# Patient Record
Sex: FEMALE | Race: OTHER | HISPANIC OR LATINO | Employment: UNEMPLOYED | URBAN - METROPOLITAN AREA
[De-identification: names, ages, dates, MRNs, and addresses within clinical notes are randomized per-mention and may not be internally consistent; named-entity substitution may affect disease eponyms.]

---

## 2017-04-06 ENCOUNTER — GENERIC CONVERSION - ENCOUNTER (OUTPATIENT)
Dept: OTHER | Facility: OTHER | Age: 47
End: 2017-04-06

## 2018-03-07 NOTE — PROGRESS NOTES
History of Present Illness    Revaccination   Vaccine Information: Vaccine(s) Given (names): hep a  Spoke with patient regarding vaccine out of temperature range, risks and benefits of revaccination and risks of not revaccinating  Action(s): Pt will be revaccinated  Pt called (attempt 1): [phone] [facility]  Letter Sent (Regular and Certified): [phone]  Other Information: patient is a  and will call our office back to schedule her series when she is in the area  Active Problems    1  Active smoker (305 1) (Z72 0)   2  Arthropathy (716 90) (M12 9)   3  Benign essential hypertension (401 1) (I10)   4  Chronic hepatitis C (070 54) (B18 2)   5  Chronic migraine without aura, with intractable migraine, so stated, without mention of   status migrainosus (346 71) (G43 719)   6  Chronic pain (338 29) (G89 29)   7  Diffuse disease of connective tissue (710 9) (M35 9)   8  Migraine headache (346 90) (G43 909)   9  Need for revaccination (V05 9) (Z23)   10  Supraventricular tachycardia (427 89) (I47 1)   11  Tobacco use (305 1) (Z72 0)   12  Vitamin D deficiency (268 9) (E55 9)    Immunizations  Hepatitis A --- Nell Beatriz: 2014   Hepatitis B --- Nell Husk: 2014   Influenza --- Nellruddy Henderson: 15-Oct-2012; [facility]     Current Meds   1  Nadolol 20 MG Oral Tablet; Take 1 tablet daily   2  Naproxen 500 MG Oral Tablet   3  Naratriptan HCl - 2 5 MG Oral Tablet; TAKE 1 TABLET AT ONSET OF HEADACHE, MAY   REPEAT ONCE IN 2 HOURS (max 2 tabs/day; 3days/wk)    Allergies    1   Bactrim TABS    Signatures   Electronically signed by : Rosa Elena Quiñonez DO; 2017  4:40PM EST                       (Author)

## 2019-07-03 ENCOUNTER — APPOINTMENT (EMERGENCY)
Dept: RADIOLOGY | Facility: HOSPITAL | Age: 49
End: 2019-07-03
Payer: COMMERCIAL

## 2019-07-03 ENCOUNTER — HOSPITAL ENCOUNTER (EMERGENCY)
Facility: HOSPITAL | Age: 49
Discharge: HOME | End: 2019-07-03
Attending: EMERGENCY MEDICINE
Payer: COMMERCIAL

## 2019-07-03 ENCOUNTER — APPOINTMENT (EMERGENCY)
Dept: NON INVASIVE DIAGNOSTICS | Facility: HOSPITAL | Age: 49
End: 2019-07-03
Payer: COMMERCIAL

## 2019-07-03 VITALS
HEART RATE: 79 BPM | TEMPERATURE: 98.7 F | RESPIRATION RATE: 16 BRPM | HEIGHT: 67 IN | OXYGEN SATURATION: 98 % | SYSTOLIC BLOOD PRESSURE: 111 MMHG | BODY MASS INDEX: 32.98 KG/M2 | WEIGHT: 210.1 LBS | DIASTOLIC BLOOD PRESSURE: 81 MMHG

## 2019-07-03 DIAGNOSIS — M79.604 RIGHT LEG PAIN: Primary | ICD-10-CM

## 2019-07-03 DIAGNOSIS — S80.11XA CONTUSION OF RIGHT LOWER EXTREMITY, INITIAL ENCOUNTER: ICD-10-CM

## 2019-07-03 DIAGNOSIS — R52 PAIN: ICD-10-CM

## 2019-07-03 PROCEDURE — 93971 EXTREMITY STUDY: CPT | Performed by: SURGERY

## 2019-07-03 PROCEDURE — 73610 X-RAY EXAM OF ANKLE: CPT

## 2019-07-03 PROCEDURE — 73590 X-RAY EXAM OF LOWER LEG: CPT

## 2019-07-03 PROCEDURE — 73630 X-RAY EXAM OF FOOT: CPT

## 2019-07-03 PROCEDURE — 99284 EMERGENCY DEPT VISIT MOD MDM: CPT | Performed by: PHYSICIAN ASSISTANT

## 2019-07-03 PROCEDURE — 96372 THER/PROPH/DIAG INJ SC/IM: CPT

## 2019-07-03 PROCEDURE — 93971 EXTREMITY STUDY: CPT

## 2019-07-03 PROCEDURE — 99284 EMERGENCY DEPT VISIT MOD MDM: CPT

## 2019-07-03 RX ORDER — KETOROLAC TROMETHAMINE 30 MG/ML
15 INJECTION, SOLUTION INTRAMUSCULAR; INTRAVENOUS ONCE
Status: COMPLETED | OUTPATIENT
Start: 2019-07-03 | End: 2019-07-03

## 2019-07-03 RX ADMIN — KETOROLAC TROMETHAMINE 15 MG: 30 INJECTION, SOLUTION INTRAMUSCULAR at 14:35

## 2019-07-03 NOTE — ED NOTES
Pt being transported to vascular by JAYANT Gutierrez at this time        Adi Kelly RN  07/03/19 7439

## 2019-07-03 NOTE — DISCHARGE INSTRUCTIONS
- Take ibuprofen (Motrin) every 6 - 8 hours as written on bottle for pain  - Take acetaminophen (Tylenol) every 4 - 6 hours as written on bottle for pain

## 2019-07-03 NOTE — ED PROVIDER NOTES
History  Chief Complaint   Patient presents with    Leg Pain     Pt reports 3 days ago she walking into a "tent spike" on the ground  Pt reports she has a pin in her R ankle  Pt reports increased pain  Swelling and ecchymosis noted to the R ankle and foot  78-year-old female presents for evaluation of right lower leg, ankle and foot pain for the past 3 days  Reports that she was in [facility] 3 days ago at [employer] and states that while she was walking she hit her leg against an iron tent stake  Noticed bruising initially over right leg, medial aspect and yesterday noted it over her ankle  Has a history of surgery in the past at age 13, with a screw in place  States that she continued walking with pain  Has been taking ibuprofen without much relief  States that she returned from [facility] last night on a 9 hour flight  She states she has been recently noting bilateral leg swelling over the past few months  States she is a   States she wore socks today and noted swelling in her leg  Also reports some cramping in her right leg  Denies history of DVT or PE in the past  She is able to bear weight and walk on her feet  Denies paraesthesias, numbness or open wounds  Denies SOB, chest pain or dyspnea  None       Past Medical History:   Diagnosis Date    Hepatitis C        Past Surgical History:   Procedure Laterality Date    ANKLE SURGERY Right     TUBAL LIGATION         History reviewed  No pertinent family history  I have reviewed and agree with the history as documented  Social History     Tobacco Use    Smoking status: Current Every Day Smoker     Packs/day: 2 00     Types: Cigarettes    Smokeless tobacco: Never Used   Substance Use Topics    Alcohol use: Yes     Comment: socially    Drug use: Never        Review of Systems   Constitutional: Negative for chills and fever  Cardiovascular: Positive for leg swelling  Musculoskeletal: Positive for arthralgias and myalgias     Skin: Positive for color change (bruising)  Negative for pallor, rash and wound  Neurological: Negative for weakness and numbness  Physical Exam  Physical Exam   Constitutional: She appears well-developed and well-nourished  No distress  HENT:   Head: Normocephalic and atraumatic  Musculoskeletal: Normal range of motion  She exhibits tenderness  She exhibits no edema  Right ankle: She exhibits swelling and ecchymosis  She exhibits normal range of motion, no deformity, no laceration and normal pulse  Tenderness  Medial malleolus tenderness found  No lateral malleolus, no AITFL, no CF ligament, no posterior TFL, no head of 5th metatarsal and no proximal fibula tenderness found  Full AROM and 5/5 strength in her LE b/l  Swelling and 2+ pitting edema noted to legs  bruising noted over medial distal aspect of tibia and into medial malleolus and dorsal aspect of foot  Tenderness to palpation  NVI  Neurological: She is alert  Skin: Skin is warm  Capillary refill takes less than 2 seconds  No rash noted  She is not diaphoretic  No erythema  Psychiatric: She has a normal mood and affect  Vitals reviewed        Vital Signs  ED Triage Vitals [07/03/19 1341]   Temperature Pulse Respirations Blood Pressure SpO2   (!) 97 4 °F (36 3 °C) 103 20 131/95 98 %      Temp Source Heart Rate Source Patient Position - Orthostatic VS BP Location FiO2 (%)   Tympanic Monitor Sitting Left arm --      Pain Score       5           Vitals:    07/03/19 1341 07/03/19 1541   BP: 131/95 111/81   Pulse: 103 79   Patient Position - Orthostatic VS: Sitting Sitting         Visual Acuity      ED Medications  Medications   ketorolac (TORADOL) injection 15 mg (15 mg Intramuscular Given 7/3/19 1435)       Diagnostic Studies  Results Reviewed     None                 VAS lower limb venous duplex study, unilateral/limited   Final Result by Rancho Jackson MD (07/03 9899)      XR foot 3+ views RIGHT   Final Result by Amira Paul MD (07/03 1515)      No acute osseous abnormality  Workstation performed: [id number]         XR ankle 3+ views RIGHT   Final Result by Hoa Butler MD (07/03 1516)      No acute osseous abnormality  Workstation performed: [id number]         XR tibia fibula 2 views RIGHT   Final Result by Hoa Butler MD (07/03 1517)      No acute osseous abnormality  Workstation performed: [id number]                    Procedures  Procedures       ED Course  ED Course as of Jul 04 1128 Wed Jul 03, 2019   1553 Called vascular lab - negative for DVT  MDM  Number of Diagnoses or Management Options  Contusion of right lower extremity, initial encounter:   Right leg pain:   Diagnosis management comments: No fractures noted on read  Negative DVT  Will advise RICE and anti-inflammatories for pain  Pt states she does not want crutches at this time  Is able to ambulate  Amount and/or Complexity of Data Reviewed  Tests in the radiology section of CPT®: ordered and reviewed        Disposition  Final diagnoses:   Right leg pain   Contusion of right lower extremity, initial encounter     Time reflects when diagnosis was documented in both MDM as applicable and the Disposition within this note     Time User Action Codes Description Comment    7/3/2019  2:37 PM Duke Brochure Add [R52] Pain     7/3/2019  3:58 PM Chrissy Henson Add [Z49 863] Right leg pain     7/3/2019  3:58 PM Chrissy Henson Add [S80 11XA] Contusion of right lower extremity, initial encounter       ED Disposition     ED Disposition Condition Date/Time Comment    Discharge Stable Wed Jul 3, 2019  3:58 PM Williemae Samples discharge to home/self care              Follow-up Information     Follow up With Specialties Details Why Contact Info Additional [address] [facility] Schedule an appointment as soon as possible for a visit in 2 weeks Follow up for re-check of symptoms, If symptoms persist  [address] [id number]  [address], [address] [place] [address], [id number]          There are no discharge medications for this patient  No discharge procedures on file      ED Provider  Electronically Signed by           Owen Aguirre PA-C  07/04/19 2820

## 2020-05-05 ENCOUNTER — APPOINTMENT (EMERGENCY)
Dept: RADIOLOGY | Facility: HOSPITAL | Age: 50
End: 2020-05-05
Payer: COMMERCIAL

## 2020-05-05 ENCOUNTER — HOSPITAL ENCOUNTER (EMERGENCY)
Facility: HOSPITAL | Age: 50
Discharge: HOME | End: 2020-05-05
Attending: EMERGENCY MEDICINE | Admitting: EMERGENCY MEDICINE
Payer: COMMERCIAL

## 2020-05-05 VITALS
OXYGEN SATURATION: 99 % | HEART RATE: 63 BPM | BODY MASS INDEX: 33.39 KG/M2 | DIASTOLIC BLOOD PRESSURE: 73 MMHG | RESPIRATION RATE: 16 BRPM | SYSTOLIC BLOOD PRESSURE: 114 MMHG | WEIGHT: 210 LBS | TEMPERATURE: 98 F

## 2020-05-05 DIAGNOSIS — R00.2 PALPITATIONS: ICD-10-CM

## 2020-05-05 DIAGNOSIS — H54.7 VISION PROBLEMS: Primary | ICD-10-CM

## 2020-05-05 LAB
ANION GAP SERPL CALCULATED.3IONS-SCNC: 7 MMOL/L (ref 4–13)
BASOPHILS # BLD AUTO: 0.05 THOUSANDS/ΜL (ref 0–0.1)
BASOPHILS NFR BLD AUTO: 0 % (ref 0–1)
BUN SERPL-MCNC: 9 MG/DL (ref 5–25)
CALCIUM SERPL-MCNC: 9.8 MG/DL (ref 8.3–10.1)
CHLORIDE SERPL-SCNC: 112 MMOL/L (ref 100–108)
CO2 SERPL-SCNC: 24 MMOL/L (ref 21–32)
CREAT SERPL-MCNC: 0.79 MG/DL (ref 0.6–1.3)
EOSINOPHIL # BLD AUTO: 0.47 THOUSAND/ΜL (ref 0–0.61)
EOSINOPHIL NFR BLD AUTO: 4 % (ref 0–6)
ERYTHROCYTE [DISTWIDTH] IN BLOOD BY AUTOMATED COUNT: 13 % (ref 11.6–15.1)
GFR SERPL CREATININE-BSD FRML MDRD: 87 ML/MIN/1.73SQ M
GLUCOSE SERPL-MCNC: 97 MG/DL (ref 65–140)
HCT VFR BLD AUTO: 45.5 % (ref 34.8–46.1)
HGB BLD-MCNC: 15.5 G/DL (ref 11.5–15.4)
IMM GRANULOCYTES # BLD AUTO: 0.04 THOUSAND/UL (ref 0–0.2)
IMM GRANULOCYTES NFR BLD AUTO: 0 % (ref 0–2)
LYMPHOCYTES # BLD AUTO: 2.75 THOUSANDS/ΜL (ref 0.6–4.47)
LYMPHOCYTES NFR BLD AUTO: 24 % (ref 14–44)
MCH RBC QN AUTO: 31.9 PG (ref 26.8–34.3)
MCHC RBC AUTO-ENTMCNC: 34.1 G/DL (ref 31.4–37.4)
MCV RBC AUTO: 94 FL (ref 82–98)
MONOCYTES # BLD AUTO: 0.76 THOUSAND/ΜL (ref 0.17–1.22)
MONOCYTES NFR BLD AUTO: 7 % (ref 4–12)
NEUTROPHILS # BLD AUTO: 7.64 THOUSANDS/ΜL (ref 1.85–7.62)
NEUTS SEG NFR BLD AUTO: 65 % (ref 43–75)
NRBC BLD AUTO-RTO: 0 /100 WBCS
PLATELET # BLD AUTO: 203 THOUSANDS/UL (ref 149–390)
PMV BLD AUTO: 11.8 FL (ref 8.9–12.7)
POTASSIUM SERPL-SCNC: 4 MMOL/L (ref 3.5–5.3)
RBC # BLD AUTO: 4.86 MILLION/UL (ref 3.81–5.12)
SODIUM SERPL-SCNC: 143 MMOL/L (ref 136–145)
TROPONIN I SERPL-MCNC: <0.02 NG/ML
WBC # BLD AUTO: 11.71 THOUSAND/UL (ref 4.31–10.16)

## 2020-05-05 PROCEDURE — 93005 ELECTROCARDIOGRAM TRACING: CPT

## 2020-05-05 PROCEDURE — 99285 EMERGENCY DEPT VISIT HI MDM: CPT | Performed by: EMERGENCY MEDICINE

## 2020-05-05 PROCEDURE — 99285 EMERGENCY DEPT VISIT HI MDM: CPT

## 2020-05-05 PROCEDURE — 80048 BASIC METABOLIC PNL TOTAL CA: CPT | Performed by: EMERGENCY MEDICINE

## 2020-05-05 PROCEDURE — 70498 CT ANGIOGRAPHY NECK: CPT

## 2020-05-05 PROCEDURE — 84484 ASSAY OF TROPONIN QUANT: CPT | Performed by: EMERGENCY MEDICINE

## 2020-05-05 PROCEDURE — 70496 CT ANGIOGRAPHY HEAD: CPT

## 2020-05-05 PROCEDURE — 96360 HYDRATION IV INFUSION INIT: CPT

## 2020-05-05 PROCEDURE — 85025 COMPLETE CBC W/AUTO DIFF WBC: CPT | Performed by: EMERGENCY MEDICINE

## 2020-05-05 PROCEDURE — 36415 COLL VENOUS BLD VENIPUNCTURE: CPT | Performed by: EMERGENCY MEDICINE

## 2020-05-05 RX ADMIN — IOHEXOL 100 ML: 350 INJECTION, SOLUTION INTRAVENOUS at 21:36

## 2020-05-05 RX ADMIN — SODIUM CHLORIDE 1000 ML: 0.9 INJECTION, SOLUTION INTRAVENOUS at 20:41

## 2020-05-06 ENCOUNTER — TELEPHONE (OUTPATIENT)
Dept: NEUROLOGY | Facility: CLINIC | Age: 50
End: 2020-05-06

## 2020-05-06 LAB
ATRIAL RATE: 69 BPM
P AXIS: 37 DEGREES
PR INTERVAL: 122 MS
QRS AXIS: 31 DEGREES
QRSD INTERVAL: 80 MS
QT INTERVAL: 372 MS
QTC INTERVAL: 398 MS
T WAVE AXIS: 41 DEGREES
VENTRICULAR RATE: 69 BPM

## 2020-05-06 PROCEDURE — 93010 ELECTROCARDIOGRAM REPORT: CPT | Performed by: INTERNAL MEDICINE

## 2020-05-11 ENCOUNTER — OFFICE VISIT (OUTPATIENT)
Dept: CARDIOLOGY CLINIC | Facility: CLINIC | Age: 50
End: 2020-05-11
Payer: COMMERCIAL

## 2020-05-11 VITALS
SYSTOLIC BLOOD PRESSURE: 118 MMHG | BODY MASS INDEX: 33.49 KG/M2 | OXYGEN SATURATION: 96 % | HEIGHT: 67 IN | WEIGHT: 213.4 LBS | DIASTOLIC BLOOD PRESSURE: 60 MMHG | HEART RATE: 88 BPM

## 2020-05-11 DIAGNOSIS — R55 SYNCOPE, UNSPECIFIED SYNCOPE TYPE: Primary | ICD-10-CM

## 2020-05-11 DIAGNOSIS — I47.1 SVT (SUPRAVENTRICULAR TACHYCARDIA) (HCC): ICD-10-CM

## 2020-05-11 DIAGNOSIS — E55.9 VITAMIN D DEFICIENCY: ICD-10-CM

## 2020-05-11 PROBLEM — I47.10 SVT (SUPRAVENTRICULAR TACHYCARDIA): Status: ACTIVE | Noted: 2020-05-11

## 2020-05-11 PROCEDURE — 99204 OFFICE O/P NEW MOD 45 MIN: CPT | Performed by: INTERNAL MEDICINE

## 2020-10-01 ENCOUNTER — TELEPHONE (OUTPATIENT)
Dept: OBGYN CLINIC | Facility: CLINIC | Age: 50
End: 2020-10-01

## 2022-04-24 ENCOUNTER — OFFICE VISIT (OUTPATIENT)
Dept: URGENT CARE | Facility: MEDICAL CENTER | Age: 52
End: 2022-04-24
Payer: COMMERCIAL

## 2022-04-24 VITALS
RESPIRATION RATE: 20 BRPM | OXYGEN SATURATION: 97 % | BODY MASS INDEX: 35.61 KG/M2 | TEMPERATURE: 97.4 F | DIASTOLIC BLOOD PRESSURE: 70 MMHG | WEIGHT: 224 LBS | HEART RATE: 101 BPM | SYSTOLIC BLOOD PRESSURE: 140 MMHG

## 2022-04-24 DIAGNOSIS — K04.7 TOOTH ABSCESS: Primary | ICD-10-CM

## 2022-04-24 PROCEDURE — 99213 OFFICE O/P EST LOW 20 MIN: CPT | Performed by: FAMILY MEDICINE

## 2022-04-24 RX ORDER — CLINDAMYCIN HYDROCHLORIDE 300 MG/1
300 CAPSULE ORAL 4 TIMES DAILY
Qty: 28 CAPSULE | Refills: 0 | Status: SHIPPED | OUTPATIENT
Start: 2022-04-24 | End: 2022-05-01

## 2022-04-24 NOTE — PATIENT INSTRUCTIONS
I prescribed clindamycin 300 mg orally 4 times a day for up to 7 days  Recommended patient use topical Ambesol or Oragel for gum pain  Recommend oral rinse containing warm water and peroxide as needed  She is to contact her dentist as soon as possible  Dental Abscess   WHAT YOU NEED TO KNOW:   A dental abscess is a collection of pus in or around a tooth  A dental abscess is caused by bacteria  The bacteria can enter the tooth when the enamel (outer part of the tooth) is damaged by tooth decay  Bacteria can also enter the tooth through a chip in the tooth or a cut in the gum  Food particles that are stuck between the teeth for a long time may also lead to an abscess  DISCHARGE INSTRUCTIONS:   Return to the emergency department if:   · You have severe pain in your tooth or jaw  · You have trouble breathing because of pain or swelling  Call your doctor if:   · Your symptoms get worse, even after treatment  · Your mouth is bleeding  · You cannot eat or drink because of pain or swelling  · Your abscess returns  · You have an injury that causes a crack in your tooth  · You have questions or concerns about your condition or care  Medicines: You may  need any of the following:  · Antibiotics  help treat a bacterial infection  · NSAIDs , such as ibuprofen, help decrease swelling, pain, and fever  This medicine is available with or without a doctor's order  NSAIDs can cause stomach bleeding or kidney problems in certain people  If you take blood thinner medicine, always ask your healthcare provider if NSAIDs are safe for you  Always read the medicine label and follow directions  · Acetaminophen  decreases pain and fever  It is available without a doctor's order  Ask how much to take and how often to take it  Follow directions   Read the labels of all other medicines you are using to see if they also contain acetaminophen, or ask your doctor or pharmacist  Acetaminophen can cause liver damage if not taken correctly  Do not use more than 4 grams (4,000 milligrams) total of acetaminophen in one day  · Prescription pain medicine  may be given  Ask your healthcare provider how to take this medicine safely  Some prescription pain medicines contain acetaminophen  Do not take other medicines that contain acetaminophen without talking to your healthcare provider  Too much acetaminophen may cause liver damage  Prescription pain medicine may cause constipation  Ask your healthcare provider how to prevent or treat constipation  · Take your medicine as directed  Contact your healthcare provider if you think your medicine is not helping or if you have side effects  Tell him of her if you are allergic to any medicine  Keep a list of the medicines, vitamins, and herbs you take  Include the amounts, and when and why you take them  Bring the list or the pill bottles to follow-up visits  Carry your medicine list with you in case of an emergency  Self-care:   · Rinse your mouth every 2 hours with salt water  This will help keep the area clean  · Gently brush your teeth twice a day with a soft tooth brush  This will help keep the area clean  · Eat soft foods as directed  Soft foods may cause less pain  Examples include applesauce, yogurt, and cooked pasta  Ask your healthcare provider how long to follow this instruction  · Apply a warm compress to your tooth or gum  Use a cotton ball or gauze soaked in warm water  Remove the compress in 10 minutes or when it becomes cool  Repeat 3 times a day  Prevent another abscess:   · Brush your teeth at least 2 times a day  with fluoride toothpaste  · Use dental floss at least once a day  to clean between your teeth  · Rinse your mouth with water or mouthwash  after meals and snacks  Chew sugarless gum  · Avoid sugary and starchy food that can stick between your teeth    Limit drinks high in sugar, such as soda or fruit juice     · See your dentist every 6 months  for dental cleanings and oral exams  Follow up with your doctor or dentist in 24 hours, or as directed: Your healthcare provider will need to check your teeth and gums  Write down your questions so you remember to ask them during your visits  © Copyright [employer] 2022 Information is for End User's use only and may not be sold, redistributed or otherwise used for commercial purposes  All illustrations and images included in CareNotes® are the copyrighted property of [employer]  or [facility] Candi Kang   The above information is an  only  It is not intended as medical advice for individual conditions or treatments  Talk to your doctor, nurse or pharmacist before following any medical regimen to see if it is safe and effective for you

## 2022-04-24 NOTE — PROGRESS NOTES
[facility] [employer]        NAME: Blade Harrington is a 46 y o  female  : 1970    MRN: [id number]  DATE: 2022  TIME: 7:53 PM    Assessment and Plan   Tooth abscess [K04 7]  1  Tooth abscess  clindamycin (CLEOCIN) 300 MG capsule         Patient Instructions       Follow up with PCP in 3-5 days  Proceed to  ER if symptoms worsen  Chief Complaint     Chief Complaint   Patient presents with    Dental Pain     Patient reports pain in her mouth near her tooth and believes she might have an absess  Patient reports swelling and drainage without fever  Patient denies trouble swallowing  History of Present Illness       49-year-old female here today with complaint of lower gum pain for the last 2 days  She has had problem with dental infection in the past   Describes broken 2 missing T  Pain is predominant in the left lower jaw radiating to the back  Denies any fever  However, she believes she has had some drainage  She has been taking over-the-counter ibuprofen up to 800 mg every 6 hour with minimal improvement  Review of Systems   Review of Systems   Constitutional: Negative  HENT: Positive for dental problem            Current Medications       Current Outpatient Medications:     clindamycin (CLEOCIN) 300 MG capsule, Take 1 capsule (300 mg total) by mouth 4 (four) times a day for 7 days, Disp: 28 capsule, Rfl: 0    Current Allergies     Allergies as of 2022 - Reviewed 2022   Allergen Reaction Noted    Bactrim [sulfamethoxazole-trimethoprim] Rash 2019            The following portions of the patient's history were reviewed and updated as appropriate: allergies, current medications, past family history, past medical history, past social history, past surgical history and problem list      Past Medical History:   Diagnosis Date    Hepatitis C        Past Surgical History:   Procedure Laterality Date    ANKLE SURGERY Right     TUBAL LIGATION         Family History   Problem Relation Age of Onset    Hypertension Mother     Alzheimer's disease Maternal Grandmother     Alzheimer's disease Maternal Grandfather          Medications have been verified  Objective   /70   Pulse 101   Temp (!) 97 4 °F (36 3 °C)   Resp 20   Wt 102 kg (224 lb)   SpO2 97%   BMI 35 61 kg/m²   No LMP recorded  Physical Exam     Physical Exam  Vitals and nursing note reviewed  Constitutional:       Appearance: Normal appearance  HENT:      Mouth/Throat:      Comments: Oral exam:  Reveals many absent teeth  there is erythema a small abscess location of the left lower gum line which is tender  Pain radiates into the left lower jaw  Neurological:      Mental Status: She is alert

## 2023-04-07 ENCOUNTER — OFFICE VISIT (OUTPATIENT)
Dept: FAMILY MEDICINE CLINIC | Facility: CLINIC | Age: 53
End: 2023-04-07

## 2023-04-07 ENCOUNTER — APPOINTMENT (OUTPATIENT)
Dept: RADIOLOGY | Facility: MEDICAL CENTER | Age: 53
End: 2023-04-07

## 2023-04-07 ENCOUNTER — TELEPHONE (OUTPATIENT)
Dept: ADMINISTRATIVE | Facility: OTHER | Age: 53
End: 2023-04-07

## 2023-04-07 VITALS
TEMPERATURE: 97.7 F | RESPIRATION RATE: 14 BRPM | WEIGHT: 229.6 LBS | HEIGHT: 68 IN | SYSTOLIC BLOOD PRESSURE: 120 MMHG | BODY MASS INDEX: 34.8 KG/M2 | OXYGEN SATURATION: 97 % | HEART RATE: 92 BPM | DIASTOLIC BLOOD PRESSURE: 70 MMHG

## 2023-04-07 DIAGNOSIS — E66.09 CLASS 2 OBESITY DUE TO EXCESS CALORIES WITHOUT SERIOUS COMORBIDITY WITH BODY MASS INDEX (BMI) OF 35.0 TO 35.9 IN ADULT: ICD-10-CM

## 2023-04-07 DIAGNOSIS — Z12.31 ENCOUNTER FOR SCREENING MAMMOGRAM FOR MALIGNANT NEOPLASM OF BREAST: ICD-10-CM

## 2023-04-07 DIAGNOSIS — M45.0 ANKYLOSING SPONDYLITIS OF MULTIPLE SITES IN SPINE (HCC): ICD-10-CM

## 2023-04-07 DIAGNOSIS — B17.10 ACUTE HEPATITIS C VIRUS INFECTION WITHOUT HEPATIC COMA: ICD-10-CM

## 2023-04-07 DIAGNOSIS — M54.41 ACUTE BILATERAL LOW BACK PAIN WITH BILATERAL SCIATICA: ICD-10-CM

## 2023-04-07 DIAGNOSIS — Z11.4 ENCOUNTER FOR SCREENING FOR HIV: ICD-10-CM

## 2023-04-07 DIAGNOSIS — I47.1 SUPRAVENTRICULAR TACHYCARDIA (HCC): Primary | ICD-10-CM

## 2023-04-07 DIAGNOSIS — B18.2 CHRONIC HEPATITIS C WITHOUT HEPATIC COMA (HCC): ICD-10-CM

## 2023-04-07 DIAGNOSIS — Z12.11 COLON CANCER SCREENING: ICD-10-CM

## 2023-04-07 DIAGNOSIS — M54.42 ACUTE BILATERAL LOW BACK PAIN WITH BILATERAL SCIATICA: ICD-10-CM

## 2023-04-07 DIAGNOSIS — Z12.4 CERVICAL CANCER SCREENING: ICD-10-CM

## 2023-04-07 PROBLEM — I47.10 SVT (SUPRAVENTRICULAR TACHYCARDIA): Status: RESOLVED | Noted: 2020-05-11 | Resolved: 2023-04-07

## 2023-04-07 RX ORDER — MELOXICAM 15 MG/1
15 TABLET ORAL DAILY
COMMUNITY
Start: 2023-02-21

## 2023-04-07 RX ORDER — METOPROLOL SUCCINATE 25 MG/1
25 TABLET, EXTENDED RELEASE ORAL DAILY
Qty: 30 TABLET | Refills: 2 | Status: SHIPPED | OUTPATIENT
Start: 2023-04-07

## 2023-04-07 RX ORDER — METHOCARBAMOL 750 MG/1
750 TABLET, FILM COATED ORAL 4 TIMES DAILY PRN
COMMUNITY
Start: 2023-03-20

## 2023-04-07 NOTE — PROGRESS NOTES
Name: Cristobal Farias      : 1970      MRN: [id number]  Encounter Provider: Tio Murray MD  Encounter Date: 2023   Encounter department: [address] [place] [address]     1  Supraventricular tachycardia ([facility] [address] )  Assessment & Plan:  Seen by cardiology in the past and was started on metoprolol she was recommended to have an ablation and then she stopped following up we will refer today for consideration of treatment options    Orders:  -     metoprolol succinate (TOPROL-XL) 25 mg 24 hr tablet; Take 1 tablet (25 mg total) by mouth daily  -     POCT ECG  -     Ambulatory Referral to Cardiology; Future  -     TSH, 3rd generation with Free T4 reflex; Future  -     CBC and differential; Future    2  Encounter for screening mammogram for malignant neoplasm of breast    3  Cervical cancer screening  -     Ambulatory Referral to Obstetrics / Gynecology; Future    4  Colon cancer screening    5  Acute bilateral low back pain with bilateral sciatica  -     XR spine lumbar minimum 4 views non injury; Future; Expected date: 2023  -     Ambulatory Referral to Physical Therapy; Future    6  Acute hepatitis C virus infection without hepatic coma  -     Comprehensive metabolic panel; Future  -     Chronic Hepatitis Panel; Future  -     Hepatitis C RNA, quantitative, PCR; Future  -     Ambulatory Referral to Gastroenterology; Future    7  Ankylosing spondylitis of multiple sites in spine [facility])  Assessment & Plan:  Reports being diagnosed with this about 10 years ago unsure as to where there is no mentioning she is being worked up for this in her record we will obtain some x-rays today and refer to rheumatology for further evaluation,     Orders:  -     Ambulatory Referral to Rheumatology; Future    8  Encounter for screening for HIV  -     : HIV 1/2 AB/AG w Reflex [facility] for 2 yr old and above; Future    9   Class 2 obesity due to excess calories without serious comorbidity with body mass index (BMI) of 35 0 to 35 9 in adult  -     Lipid panel; Future  -     Hemoglobin A1C; Future    10  Chronic hepatitis C without hepatic coma ([facility] [address] )  Assessment & Plan:  Has had hepatitis C for many years she has been scheduled to see a few hepatitis C specialist most recently infectious disease at [facility] she has never followed through on treatment we will reorder labs today and refer for consideration of treatment options      BMI Counseling: Body mass index is 35 43 kg/m²  The BMI is above normal  Nutrition recommendations include decreasing portion sizes and moderation in carbohydrate intake  No pharmacotherapy was ordered  Rationale for BMI follow-up plan is due to patient being overweight or obese  Depression Screening and Follow-up Plan: Patient was screened for depression during today's encounter  They screened negative with a PHQ-2 score of 0  Tobacco Cessation Counseling: Tobacco cessation counseling was provided  The patient is sincerely urged to quit consumption of tobacco  She is not ready to quit tobacco  Medication options discussed  Subjective     HPI     45year old with pmh of anklyosing spondylsis reported, sciatica for last 6 months, Hep C, abnormal mammogram, presenting today to establish care  reports sciatica pain is worsening, radiaties down left leg, is seeing spine specialist/physiatrist at [facility]  Reports being diagnoised with sciatica/pirformis  Reprots diagnosed with ankylosis spondylitis by a rheumatologist 2012 after having headaches  Patient has been out of health care for a number of years does appear to have been diagnosed with hepatitis C in the past she did not follow-up with her treatments she is interested in being treated at this time      She reports she saw a cardiologist regarding her supraventricular tachycardia she was placed on metoprolol at that time and recommended to have an ablation she did not have an ablation so she did not follow-up  Reports having episode of heart racing about 3 weeks ago that lasted about 10 seconds and self resolved  She also reports she saw a rheumatologist about 10 years ago diagnosed ankylosing spondylitis she did not follow-up with them either/    She has recently seen a spine specialist at [facility] who recommended PT and chiropractor she did go to the chiropractor but is yet to go to PT she was told to follow-up after PT and that they would consider doing imaging at that time  She did have recent abnormal mammogram and was told to have repeat in 6 months she does have this scheduled  Recommended colonoscopy today patient declined she will discuss her options when she sees GI for her hepatitis C  Review of Systems   Constitutional: Negative for activity change and appetite change  Respiratory: Negative for apnea, cough, chest tightness and shortness of breath  Gastrointestinal: Negative for abdominal distention and abdominal pain  Musculoskeletal: Negative for arthralgias and back pain         Past Medical History:   Diagnosis Date   • Hepatitis C      Past Surgical History:   Procedure Laterality Date   • ANKLE SURGERY Right    • TUBAL LIGATION       Family History   Problem Relation Age of Onset   • Hypertension Mother    • Alzheimer's disease Maternal Grandmother    • Alzheimer's disease Maternal Grandfather      Social History     Socioeconomic History   • Marital status: /Civil Union     Spouse name: None   • Number of children: None   • Years of education: None   • Highest education level: None   Occupational History   • None   Tobacco Use   • Smoking status: Every Day     Packs/day: 2 00     Types: Cigarettes   • Smokeless tobacco: Never   Vaping Use   • Vaping Use: Every day   Substance and Sexual Activity   • Alcohol use: Yes     Comment: socially   • Drug use: Never   • Sexual activity: None   Other Topics Concern   • None   Social History Narrative   • None "    Social Determinants of Health     Financial Resource Strain: Not on file   Food Insecurity: Not on file   Transportation Needs: Not on file   Physical Activity: Not on file   Stress: Not on file   Social Connections: Not on file   Intimate Partner Violence: Not on file   Housing Stability: Not on file     Current Outpatient Medications on File Prior to Visit   Medication Sig   • methocarbamol (ROBAXIN) 750 mg tablet Take 750 mg by mouth 4 (four) times a day as needed   • meloxicam (MOBIC) 15 mg tablet Take 15 mg by mouth daily     Allergies   Allergen Reactions   • Bactrim [Sulfamethoxazole-Trimethoprim] Rash     Immunization History   Administered Date(s) Administered   • Hep A, adult 04/02/2014   • Hep B, adult 04/02/2014   • INFLUENZA 12/08/2022   • Influenza, seasonal, injectable 10/15/2012, 11/22/2013       Objective     /70 (BP Location: Left arm, Patient Position: Sitting, Cuff Size: Large)   Pulse 92   Temp 97 7 °F (36 5 °C) (Temporal)   Resp 14   Ht 5' 7 5\" (1 715 m)   Wt 104 kg (229 lb 9 6 oz)   SpO2 97%   BMI 35 43 kg/m²     Physical Exam  Constitutional:       Appearance: She is obese  Cardiovascular:      Rate and Rhythm: Normal rate and regular rhythm  Pulses: Normal pulses  Pulmonary:      Effort: Pulmonary effort is normal       Breath sounds: Normal breath sounds  Abdominal:      General: Abdomen is flat  Tenderness: There is no abdominal tenderness  Musculoskeletal:         General: Normal range of motion  Comments: Generalized lumbar tenderness   Skin:     General: Skin is warm  Capillary Refill: Capillary refill takes less than 2 seconds  Neurological:      Mental Status: She is alert         Stephanie Whyte MD     I have spent a total time of 65 minutes on 04/07/23 in caring for this patient including Diagnostic results, Prognosis, Risks and benefits of tx options, Instructions for management, Patient and family education, Importance of tx " compliance, Risk factor reductions, Impressions, Documenting in the medical record, Reviewing / ordering tests, medicine, procedures   and Obtaining or reviewing history

## 2023-04-07 NOTE — ASSESSMENT & PLAN NOTE
Has had hepatitis C for many years she has been scheduled to see a few hepatitis C specialist most recently infectious disease at [facility] she has never followed through on treatment we will reorder labs today and refer for consideration of treatment options

## 2023-04-07 NOTE — ASSESSMENT & PLAN NOTE
Reports being diagnosed with this about 10 years ago unsure as to where there is no mentioning she is being worked up for this in her record we will obtain some x-rays today and refer to rheumatology for further evaluation,

## 2023-04-07 NOTE — ASSESSMENT & PLAN NOTE
Seen by cardiology in the past and was started on metoprolol she was recommended to have an ablation and then she stopped following up we will refer today for consideration of treatment options

## 2023-04-07 NOTE — TELEPHONE ENCOUNTER
Upon review of the In Basket request we were able to locate, review, and update the patient chart as requested for Mammogram     Any additional questions or concerns should be emailed to the Practice Liaisons via the appropriate education email address, please do not reply via In Basket      Thank you  Memo Hanley MA

## 2023-04-07 NOTE — PATIENT INSTRUCTIONS
1  Supraventricular tachycardia ([address] )  Assessment & Plan:  Seen by cardiology in the past and was started on metoprolol she was recommended to have an ablation and then she stopped following up we will refer today for consideration of treatment options    Orders:  -     metoprolol succinate (TOPROL-XL) 25 mg 24 hr tablet; Take 1 tablet (25 mg total) by mouth daily  -     POCT ECG  -     Ambulatory Referral to Cardiology; Future  -     TSH, 3rd generation with Free T4 reflex; Future  -     CBC and differential; Future    2  Encounter for screening mammogram for malignant neoplasm of breast    3  Cervical cancer screening  -     Ambulatory Referral to Obstetrics / Gynecology; Future    4  Colon cancer screening    5  Acute bilateral low back pain with bilateral sciatica  -     XR spine lumbar minimum 4 views non injury; Future; Expected date: 04/07/2023  -     Ambulatory Referral to Physical Therapy; Future    6  Acute hepatitis C virus infection without hepatic coma  -     Comprehensive metabolic panel; Future  -     Chronic Hepatitis Panel; Future  -     Hepatitis C RNA, quantitative, PCR; Future  -     Ambulatory Referral to Gastroenterology; Future    7  Ankylosing spondylitis of multiple sites in spine [facility])  Assessment & Plan:  Reports being diagnosed with this about 10 years ago unsure as to where there is no mentioning she is being worked up for this in her record we will obtain some x-rays today and refer to rheumatology for further evaluation,     Orders:  -     Ambulatory Referral to Rheumatology; Future    8  Encounter for screening for HIV  -     : HIV 1/2 AB/AG w Reflex [facility] for 2 yr old and above; Future    9  Class 2 obesity due to excess calories without serious comorbidity with body mass index (BMI) of 35 0 to 35 9 in adult  -     Lipid panel; Future  -     Hemoglobin A1C; Future    10   Chronic hepatitis C without hepatic coma (HCC)  Assessment & Plan:  Has had hepatitis C for many years she has been scheduled to see a few hepatitis C specialist most recently infectious disease at [facility] she has never followed through on treatment we will reorder labs today and refer for consideration of treatment options

## 2023-04-07 NOTE — TELEPHONE ENCOUNTER
----- Message from Cathy Ramírez sent at 4/7/2023  9:28 AM EDT -----  Regarding: Mammogram  04/07/23 9:29 AM    Hello, our patient Chad Sudhir has had Mammogram completed/performed  Please assist in updating the patient chart by pulling the Care Everywhere (CE) document  The date of service is 3/14/23       Thank you,  Cathy Ramírez  [facility] [address]

## 2023-04-17 ENCOUNTER — APPOINTMENT (OUTPATIENT)
Dept: PHYSICAL THERAPY | Facility: CLINIC | Age: 53
End: 2023-04-17

## 2023-04-20 ENCOUNTER — APPOINTMENT (OUTPATIENT)
Dept: PHYSICAL THERAPY | Facility: CLINIC | Age: 53
End: 2023-04-20

## 2023-04-24 ENCOUNTER — APPOINTMENT (OUTPATIENT)
Dept: PHYSICAL THERAPY | Facility: CLINIC | Age: 53
End: 2023-04-24

## 2023-04-26 ENCOUNTER — TELEPHONE (OUTPATIENT)
Dept: NEUROSURGERY | Facility: CLINIC | Age: 53
End: 2023-04-26

## 2023-04-26 NOTE — TELEPHONE ENCOUNTER
Patient called and left msg at main # stating she had called earlier today and spoke with someone who advised her to get imaging uploaded and to call back regaridng getting established for synovial cyst of spine   She states MRI was uploaded and asked for call back at [phone]

## 2023-04-27 ENCOUNTER — APPOINTMENT (OUTPATIENT)
Dept: PHYSICAL THERAPY | Facility: CLINIC | Age: 53
End: 2023-04-27

## 2023-04-27 ENCOUNTER — TELEPHONE (OUTPATIENT)
Dept: FAMILY MEDICINE CLINIC | Facility: CLINIC | Age: 53
End: 2023-04-27

## 2023-04-27 NOTE — TELEPHONE ENCOUNTER
Pt called about her CT lung scan that has been ordered by Dr Yun Payne  She said that the insurance sent something to us to fill out so it is covered because they said they never got anything from us initially  Please advise, thank you!

## 2023-04-28 NOTE — TELEPHONE ENCOUNTER
PA was started on 4/25/23 but pended, waiting for clinicals  Faxed last OV and Order for screening to [phone], marked as urgent   Tracking [id number]

## 2023-05-01 ENCOUNTER — PATIENT MESSAGE (OUTPATIENT)
Dept: FAMILY MEDICINE CLINIC | Facility: CLINIC | Age: 53
End: 2023-05-01

## 2023-05-01 ENCOUNTER — OFFICE VISIT (OUTPATIENT)
Dept: NEUROSURGERY | Facility: CLINIC | Age: 53
End: 2023-05-01

## 2023-05-01 VITALS
DIASTOLIC BLOOD PRESSURE: 82 MMHG | BODY MASS INDEX: 35.41 KG/M2 | TEMPERATURE: 98.2 F | HEIGHT: 67 IN | HEART RATE: 91 BPM | SYSTOLIC BLOOD PRESSURE: 126 MMHG | WEIGHT: 225.6 LBS | RESPIRATION RATE: 18 BRPM

## 2023-05-01 DIAGNOSIS — M71.38 SYNOVIAL CYST OF LUMBAR SPINE: Primary | ICD-10-CM

## 2023-05-01 PROBLEM — M43.16 SPONDYLOLISTHESIS OF LUMBAR REGION: Status: ACTIVE | Noted: 2023-05-01

## 2023-05-01 RX ORDER — LIDOCAINE 4 G/G
PATCH TOPICAL
COMMUNITY

## 2023-05-01 RX ORDER — METHYLPREDNISOLONE 4 MG/1
TABLET ORAL
COMMUNITY
Start: 2023-04-12 | End: 2023-05-01 | Stop reason: ALTCHOICE

## 2023-05-01 NOTE — PROGRESS NOTES
"Neurosurgery Office Note  Terrence Davis 45 y o  female MRN: [id number]      Assessment/Plan     Spondylolisthesis of lumbar region  Presents as referral from physiatrist for evaluation of lumbar back pain radiating down left leg  · Symptoms began 1 year ago, significantly worsening since November 2022  · Tried chiropractic therapy and one session of PT   · 3-4 days ago left leg radicular symptoms spontaneously resolved  · On meloxicam 15 mg daily and methocarbamol 750 mg PRN  · Sensation changes to perineal area x many years  · Recently needing to \"concentrate\" more to engage muscles to void  · On exam, some hip flexor/quad/hamstring weakness on left, otherwise intact  Imaging:  · MRI lumbar spine w/wo, 4/19/2023: Multilevel disc bulging with central neuroforaminal encroachment  Findings are quite prominent at the L4-5 level due to prominent left facet synovial cyst arriving at the L4-5 level and encroaching on the central spinal canal     Plan:  · Reviewed imaging extensively with patient  · Discussed options for treatment thoroughly including PT, RIMMA, cyst aspiration, vs surgical decompression (that would likely involve fusion as well)  · As she has not exhausted conservative management of symptoms, and radicular pain has subsided, would recommend ongoing conservative management of symptoms including PT  · She related that physiatrist offered injection however is interested in pursuing treatment with [facility] network so referral to PM placed  · Advised to return if symptoms refractory to conservative management or she develops worsening paresthesias  · Follow up as needed  Diagnoses and all orders for this visit:    Synovial cyst of lumbar spine  -     Ambulatory Referral to Neurosurgery  -     Ambulatory Referral to Pain Management;  Future    Other orders  -     Discontinue: methylPREDNISolone 4 MG tablet therapy pack; TAKE 6 TABLETS ON DAY 1 AS DIRECTED ON PACKAGE AND DECREASE BY " 1 TAB EACH DAY FOR A TOTAL OF 6 DAYS  -     Lidocaine 4 % PTCH; Apply topically          I have spent a total time of 30 minutes on 05/01/23 in caring for this patient including Diagnostic results, Risks and benefits of tx options, Instructions for management, Patient and family education, Risk factor reductions, Impressions, Counseling / Coordination of care, Documenting in the medical record, Reviewing / ordering tests, medicine, procedures   and Obtaining or reviewing history    CHIEF COMPLAINT    Chief Complaint   Patient presents with    Consult       HISTORY    History of Present Illness     45y o  year old female     With past medical history of hypertension who presents as referral from physiatrist Dr Brook Mendiola for evaluation of bilateral low back pain with radiation posteriorly down her left leg to her feet  She states that her symptoms began about a year ago but significantly worsened since November  She began seeing Dr Oscar Daughters twice she is and was referred to chiropractor and for physical therapy  Chiropractic care was not helpful at all and she had 1 session of physical therapy before results of her MRI indicated what is likely synovial cyst at L4-5 level encroaching on central spinal canal as well as multilevel disc bulging with various levels of central and foraminal stenosis  She was advised evaluated by neurosurgeon before proceeding with physical therapy  She was also started on meloxicam and Robaxin as needed which has been moderately helpful  She states she has not been to see a doctor since the birth of her daughter 20 years ago  She continues to smoke 1 pack/day  She works as a  and describes that since she stopped laying down flat her symptoms seem to have improved  She describes that pain is better with standing    Originally pain was radiating down her left leg posteriorly from her buttocks into the bottom of her foot but over the past 3 to 4 days this pain completely resolved spontaneously  She states she has to concentrate harder to void and get her urine stream started recently  She also has been experiencing irritation and dysesthetic pain to her perennial area for many years and is following up with an OB/GYN today for evaluation of this  She has no bowel issues  She lives at home with her  and 3 daughters aged 25, 22 and 32  See Discussion    REVIEW OF SYSTEMS    Review of Systems   Constitutional: Positive for fatigue  HENT: Negative  Eyes: Negative  Respiratory: Negative  Cardiovascular: Negative  Endocrine: Negative  Genitourinary: Positive for difficulty urinating (needs to focus on urinating for a couple monthhs takes awhile to get it going)  Musculoskeletal: Positive for back pain (lolwer back pain radiates from side to side more on left, down left legt reaches left foot and all toes at times describes as burning pain) and myalgias (left buttock and left calf spasms at times)  Allergic/Immunologic: Negative  Neurological: Positive for dizziness (rare), tremors (left hand at times), weakness (in general), numbness (left leg down to feet and toes was constant til a few days ago, left arm hand and 3 outter finger constant) and headaches (daily, stabbing pain like 4 times a year doesnt last long)  Negative for seizures  Hematological: Negative  Does not bruise/bleed easily  Psychiatric/Behavioral: Positive for sleep disturbance (sciatic pain/ sleeps in recliner)  ROS obtained by MA  Reviewed  See HPI       Meds/Allergies     Current Outpatient Medications   Medication Sig Dispense Refill    Lidocaine 4 % PTCH Apply topically      meloxicam (MOBIC) 15 mg tablet Take 15 mg by mouth daily      methocarbamol (ROBAXIN) 750 mg tablet Take 750 mg by mouth 4 (four) times a day as needed      metoprolol succinate (TOPROL-XL) 25 mg 24 hr tablet Take 1 tablet (25 mg total) by mouth daily 30 tablet 2     No current "facility-administered medications for this visit  Allergies   Allergen Reactions    Bactrim [Sulfamethoxazole-Trimethoprim] Rash       PAST HISTORY    Past Medical History:   Diagnosis Date    Hepatitis C        Past Surgical History:   Procedure Laterality Date    ANKLE SURGERY Right     TUBAL LIGATION         Social History     Tobacco Use    Smoking status: Every Day     Packs/day: 2 00     Types: Cigarettes    Smokeless tobacco: Never   Vaping Use    Vaping Use: Every day   Substance Use Topics    Alcohol use: Not Currently    Drug use: Never       Family History   Problem Relation Age of Onset    Hypertension Mother     Stomach cancer Maternal Grandmother     Alzheimer's disease Maternal Grandfather          Above history personally reviewed  EXAM    Vitals:Blood pressure 126/82, pulse 91, temperature 98 2 °F (36 8 °C), resp  rate 18, height 5' 7\" (1 702 m), weight 102 kg (225 lb 9 6 oz)  ,Body mass index is 35 33 kg/m²  Physical Exam  Constitutional:       Appearance: She is well-developed  She is obese  HENT:      Head: Normocephalic and atraumatic  Eyes:      Extraocular Movements: EOM normal       Pupils: Pupils are equal, round, and reactive to light  Pulmonary:      Effort: Pulmonary effort is normal    Abdominal:      Palpations: Abdomen is soft  Musculoskeletal:         General: Normal range of motion  Cervical back: Normal range of motion and neck supple  Skin:     General: Skin is warm and dry  Neurological:      General: No focal deficit present  Mental Status: She is alert and oriented to person, place, and time  Mental status is at baseline  Cranial Nerves: No cranial nerve deficit  Sensory: No sensory deficit  Motor: Weakness present        Coordination: Coordination normal  Finger-Nose-Finger Test normal       Deep Tendon Reflexes: Reflexes normal       Reflex Scores:       Patellar reflexes are 3+ on the right side and 3+ on the left " side        Achilles reflexes are 2+ on the right side and 2+ on the left side  Psychiatric:         Speech: Speech normal          Neurologic Exam     Mental Status   Oriented to person, place, and time  Oriented to person  Oriented to place  Oriented to time  Oriented to year, month and date  Registration: recalls 3 of 3 objects  Attention: normal  Concentration: normal    Speech: speech is normal   Level of consciousness: alert  Knowledge: good and consistent with education  Able to name object  Cranial Nerves     CN III, IV, VI   Pupils are equal, round, and reactive to light  Extraocular motions are normal    Right pupil: Size: 3 mm  Shape: regular  Reactivity: brisk  Consensual response: intact  Accommodation: intact  Left pupil: Size: 3 mm  Shape: regular  Reactivity: brisk  Consensual response: intact  Accommodation: intact  Nystagmus: none   Diplopia: none  Conjugate gaze: present    CN V   Right facial sensation deficit: none  Left facial sensation deficit: none    CN VII   Facial expression full, symmetric       CN VIII   Hearing: intact    CN IX, X   Palate: symmetric    CN XI   Right sternocleidomastoid strength: normal  Left sternocleidomastoid strength: normal  Right trapezius strength: normal  Left trapezius strength: normal    CN XII   Tongue: not atrophic  Fasciculations: absent  Tongue deviation: none    Motor Exam   Muscle bulk: normal  Overall muscle tone: normal  Right arm pronator drift: absent  Left arm pronator drift: absent    Strength   Right deltoid: 5/5  Left deltoid: 5/5  Right biceps: 5/5  Left biceps: 5/5  Right triceps: 5/5  Left triceps: 5/5  Right iliopsoas: 5/5  Left iliopsoas: 4/5  Right quadriceps: 5/5  Left quadriceps: 4/5  Right hamstrin/5  Left hamstrin/5  Right glutei: 5/5  Left glutei: 4/5  Right anterior tibial: 5/5  Left anterior tibial: 5/5  Right posterior tibial: 5/5  Left posterior tibial: 5/5  Right peroneal: 5/5  Left peroneal: 5/5  Right gastroc: 5/5  Left gastroc: 5/5+4/5 left hip flexors/quads/hamstrings     Sensory Exam   Light touch normal    Proprioception normal      Gait, Coordination, and Reflexes     Coordination   Finger to nose coordination: normal    Tremor   Resting tremor: absent  Intention tremor: absent  Action tremor: absent    Reflexes   Right patellar: 3+  Left patellar: 3+  Right achilles: 2+  Left achilles: 2+  Right Lino: absent  Left Lino: absent  Right ankle clonus: absent  Left ankle clonus: absent        MEDICAL DECISION MAKING    Imaging Studies:     XR spine lumbar minimum 4 views non injury    Result Date: 4/12/2023  Narrative: LUMBAR SPINE INDICATION:   M54 42: Lumbago with sciatica, left side M54 41: Lumbago with sciatica, right side  COMPARISON:  None VIEWS:  XR SPINE LUMBAR MINIMUM 4 VIEWS NON INJURY Images: 5 FINDINGS: There are 5 non rib bearing lumbar vertebral bodies  There is no evidence of acute fracture or destructive osseous lesion  Grade I spondylolithesis L4 on L5  Small endplate osteophytes at L4 and L5  The pedicles appear intact  Vascular calcifications  Impression: Mild degenerative changes of lower lumbar spine  Workstation performed: [id number]     US abdomen complete    Result Date: 4/23/2023  Narrative: ABDOMEN ULTRASOUND, COMPLETE INDICATION:   B18 2: Chronic viral hepatitis C  COMPARISON:  None TECHNIQUE:   Real-time ultrasound of the abdomen was performed with a curvilinear transducer with both volumetric sweeps and still imaging techniques  FINDINGS: PANCREAS:  Visualized portions of the pancreas are within normal limits  AORTA AND IVC:  Visualized portions are normal for patient age  LIVER: Size:  Mildly enlarged  The liver measures 18 5 cm in the midclavicular line  Contour:  Surface contour is smooth  Parenchyma:  Echogenicity and echotexture are within normal limits  Subcentimeter hepatic simple cysts measuring up to 8 mm  No evidence of suspicious hepatic mass  Limited imaging of the main portal vein shows it to be patent and hepatopetal  BILIARY: The gallbladder is normal in caliber  No wall thickening or pericholecystic fluid  No stones or sludge identified  No sonographic Eagle's sign  No intrahepatic biliary dilatation  CBD measures 3 0 mm  No choledocholithiasis  KIDNEY: Right kidney measures 11 4 x 5 0 x 4 6  cm  Volume 138 9 mL Kidney within normal limits  Left kidney measures 11 6 x 5 2 x 5 0 cm  Volume 157 5 mL Kidney within normal limits  SPLEEN: Measures 11 6 x 12 2 x 4 0 cm  Volume 294 0 mL Within normal limits  ASCITES:  None  Impression: Mild hepatomegaly  Subcentimeter hepatic cysts  Workstation performed: [id number]     US elastography    Result Date: 4/23/2023  Narrative: ELASTOGRAPHY, LIVER ULTRASOUND INDICATION:   B18 2: Chronic viral hepatitis C  COMPARISON:  None TECHNIQUE: Targeted ultrasound of the liver was performed with a curvilinear transducer on a [employer] e10 utilizing 2D SWE  A total of 10 shear wave Elastography samples were obtained  FINDINGS:  Shear Wave Elastography sampling was performed to evaluate stiffness changes within the liver associated with fibrosis  E1  6 7 kPa E2  5 45 kPa E3  6 21 kPa E4  5 91 kPa E5  5 75 kPa E6  7 77 kPa E7  6 34 kPa E8  5 59 kPa E9  7 33 kPa E10 6 04 kPa E median:  6 12 kPa  The corresponding Metavir score is F0-F1(absent or mild fibrosis), range 0-8 26kPa  <1 66 m/s  IQR/median:  13 4 %  (IQR of less than 30% is considered a satisfactory data set)  Note: that the stage of liver fibrosis may be overestimated by clinical conditions unrelated to fibrosis, including but not limited to, nonfasting, hepatic inflammation, vascular congestion, biliary obstruction, and infiltrative liver disease  Furthermore, in some patients with NAFLD, the cut-off values for compensated advanced chronic liver disease may be lower (7-9 kPa)   In causes other than viral hepatitis and nonalcoholic fatty liver disease, the cut-off values are not well established  When comparing measurements across time, a clinically significant change should be considered when the delta change is greater than 10%  In all these conditions, however, liver stiffness values within the normal range exclude significant liver fibrosis  Ultrasound-guided attenuation parameter (UGAP) Liver Steatosis Grading A1  0 67 dB/cm/MHz A2  0 62 dB/cm/MHz A3  0 7 dB/cm/MHz A4  0 59 dB/cm/MHz A5  0 58 dB/cm/MHz A6  0 64 dB/cm/MHz A7  0 57 dB/cm/MHz A8  0 51 dB/cm/MHz A9  0 54 dB/cm/MHz A10 0 77 dB/cm/MHz Attenuation coefficient:  0 63 dB/cm/MHz Liver steatosis grading:  S0 ( <5% steatosis) <0 65 dB/cm/MHz IQR/Med:  17 1 % (Less than or equal to 30% is a satisfactory data set ) Reference White paper for [employer] ultrasound-guided attenuation parameter https://www [employer]/  au/-/jssmedia/[email or web address]  pdf?la=en-au     Impression: Metavir score: F0 - F1, Absent or Mild Fibrosis According to the updated SRU consensus statement, a liver stiffness of 6 12 kPa, https://pubs  rsna org/doi/10 1148/radiol  [id number] Liver steatosis grading:  S0 ( <5% steatosis) Workstation performed: [id number]       I have personally reviewed pertinent reports     and I have personally reviewed pertinent films in PACS

## 2023-05-01 NOTE — ASSESSMENT & PLAN NOTE
"Presents as referral from physiatrist for evaluation of lumbar back pain radiating down left leg  · Symptoms began 1 year ago, significantly worsening since November 2022  · Tried chiropractic therapy and one session of PT   · 3-4 days ago left leg radicular symptoms spontaneously resolved  · On meloxicam 15 mg daily and methocarbamol 750 mg PRN  · Sensation changes to perineal area x many years  · Recently needing to \"concentrate\" more to engage muscles to void  · On exam, some hip flexor/quad/hamstring weakness on left, otherwise intact  Imaging:  · MRI lumbar spine w/wo, 4/19/2023: Multilevel disc bulging with central neuroforaminal encroachment  Findings are quite prominent at the L4-5 level due to prominent left facet synovial cyst arriving at the L4-5 level and encroaching on the central spinal canal     Plan:  · Reviewed imaging extensively with patient  · Discussed options for treatment thoroughly including PT, RIMMA, cyst aspiration, vs surgical decompression (that would likely involve fusion as well)  · As she has not exhausted conservative management of symptoms, and radicular pain has subsided, would recommend ongoing conservative management of symptoms including PT  · She related that physiatrist offered injection however is interested in pursuing treatment with [facility]'s network so referral to PM placed  · Advised to return if symptoms refractory to conservative management or she develops worsening paresthesias  · Follow up as needed          "

## 2023-05-02 ENCOUNTER — OFFICE VISIT (OUTPATIENT)
Dept: FAMILY MEDICINE CLINIC | Facility: CLINIC | Age: 53
End: 2023-05-02

## 2023-05-02 ENCOUNTER — APPOINTMENT (OUTPATIENT)
Dept: RADIOLOGY | Facility: MEDICAL CENTER | Age: 53
End: 2023-05-02

## 2023-05-02 VITALS
SYSTOLIC BLOOD PRESSURE: 122 MMHG | DIASTOLIC BLOOD PRESSURE: 80 MMHG | HEIGHT: 67 IN | WEIGHT: 226 LBS | OXYGEN SATURATION: 96 % | HEART RATE: 93 BPM | BODY MASS INDEX: 35.47 KG/M2

## 2023-05-02 DIAGNOSIS — M54.2 NECK PAIN: Primary | ICD-10-CM

## 2023-05-02 DIAGNOSIS — M54.2 NECK PAIN: ICD-10-CM

## 2023-05-02 DIAGNOSIS — R19.5 POSITIVE COLORECTAL CANCER SCREENING USING COLOGUARD TEST: ICD-10-CM

## 2023-05-02 DIAGNOSIS — F17.200 SMOKING: ICD-10-CM

## 2023-05-02 DIAGNOSIS — F17.210 SMOKING GREATER THAN 40 PACK YEARS: ICD-10-CM

## 2023-05-02 DIAGNOSIS — Z23 ENCOUNTER FOR IMMUNIZATION: ICD-10-CM

## 2023-05-02 RX ORDER — NICOTINE 21 MG/24HR
1 PATCH, TRANSDERMAL 24 HOURS TRANSDERMAL EVERY 24 HOURS
Qty: 28 PATCH | Refills: 1 | Status: SHIPPED | OUTPATIENT
Start: 2023-05-02

## 2023-05-02 RX ORDER — POLYETHYLENE GLYCOL 3350 17 G
2 POWDER IN PACKET (EA) ORAL AS NEEDED
Qty: 100 EACH | Refills: 1 | Status: SHIPPED | OUTPATIENT
Start: 2023-05-02

## 2023-05-02 NOTE — ASSESSMENT & PLAN NOTE
Patient with neck pain for many years has been worsening has her lower back pain improved we will check x-rays is seeing PT for her lower back currently

## 2023-05-02 NOTE — ASSESSMENT & PLAN NOTE
Patient did have positive Cologuard discussed today she will likely need colonoscopy with Dr Uche Martinez

## 2023-05-02 NOTE — ASSESSMENT & PLAN NOTE
Long discussion on options for smoking cessation we will start with patches and lozenges consider Wellbutrin in the future she will also try PA smoke-free, she tells me her insurance denied her CAT scan lung screening we will look into this

## 2023-05-02 NOTE — PATIENT INSTRUCTIONS
1  Neck pain  -     XR spine cervical complete 4 or 5 vw non injury; Future; Expected date: 05/02/2023    2  Smoking greater than 40 pack years    3  Encounter for immunization  -     nicotine (NICODERM CQ) 21 mg/24 hr TD 24 hr patch; Place 1 patch on the skin over 24 hours every 24 hours  -     nicotine polacrilex (COMMIT) 2 MG lozenge; Apply 1 lozenge (2 mg total) to the mouth or throat as needed for smoking cessation    4  Smoking  -     nicotine (NICODERM CQ) 21 mg/24 hr TD 24 hr patch; Place 1 patch on the skin over 24 hours every 24 hours  -     nicotine polacrilex (COMMIT) 2 MG lozenge; Apply 1 lozenge (2 mg total) to the mouth or throat as needed for smoking cessation    5   Positive colorectal cancer screening using Cologuard test

## 2023-05-02 NOTE — PROGRESS NOTES
Assessment/Plan:       Problem List Items Addressed This Visit        Other    Neck pain - Primary     Patient with neck pain for many years has been worsening has her lower back pain improved we will check x-rays is seeing PT for her lower back currently  Relevant Orders    XR spine cervical complete 4 or 5 vw non injury    Positive colorectal cancer screening using Cologuard test     Patient did have positive Cologuard discussed today she will likely need colonoscopy with Dr Cecilia Wilson         Smoking greater than 40 pack years     Long discussion on options for smoking cessation we will start with patches and lozenges consider Wellbutrin in the future she will also try PA smoke-free, she tells me her insurance denied her CAT scan lung screening we will look into this  Other Visit Diagnoses     Encounter for immunization        Relevant Medications    nicotine (NICODERM CQ) 21 mg/24 hr TD 24 hr patch    nicotine polacrilex (COMMIT) 2 MG lozenge    Smoking        Relevant Medications    nicotine (NICODERM CQ) 21 mg/24 hr TD 24 hr patch    nicotine polacrilex (COMMIT) 2 MG lozenge            Subjective:      Patient ID: Katharina Galvez is a 45 y o  female  HPI     57-year-old female past history of hepatitis C low back pain synovial cyst presenting today for smoking cessation treatment  She has tried in the past she did use nicotine patches 14 mg but she did not feel like this was now she does smoke up to 2 packs/day  She has also had chronic neck pain for many years that has been worsening over the past few weeks she does report her low back pain is mostly gone away since she started the neurosurgery  She did see her Cologuard results on her MyChart prior to the appointment and is planning on following up with Dr Cecilia Wilson and getting a colonoscopy    She is due for vaccines including hepatitis B she declines today does have an appointment in a couple weeks for this      The following portions of the "patient's history were reviewed and updated as appropriate: allergies, current medications, past family history, past medical history, past social history, past surgical history and problem list       Current Outpatient Medications:     Lidocaine 4 % PTCH, Apply topically, Disp: , Rfl:     meloxicam (MOBIC) 15 mg tablet, Take 15 mg by mouth daily, Disp: , Rfl:     methocarbamol (ROBAXIN) 750 mg tablet, Take 750 mg by mouth 4 (four) times a day as needed, Disp: , Rfl:     metoprolol succinate (TOPROL-XL) 25 mg 24 hr tablet, Take 1 tablet (25 mg total) by mouth daily, Disp: 30 tablet, Rfl: 2    nicotine (NICODERM CQ) 21 mg/24 hr TD 24 hr patch, Place 1 patch on the skin over 24 hours every 24 hours, Disp: 28 patch, Rfl: 1    nicotine polacrilex (COMMIT) 2 MG lozenge, Apply 1 lozenge (2 mg total) to the mouth or throat as needed for smoking cessation, Disp: 100 each, Rfl: 1     Review of Systems   Constitutional: Negative for activity change  Respiratory: Negative for cough and shortness of breath  Cardiovascular: Negative for chest pain and palpitations  Gastrointestinal: Negative for abdominal distention and abdominal pain  Musculoskeletal: Positive for arthralgias, back pain and neck pain  Neurological: Negative for dizziness and facial asymmetry  Objective:      /80 (BP Location: Right arm, Patient Position: Sitting, Cuff Size: Standard)   Pulse 93   Ht 5' 7\" (1 702 m)   Wt 103 kg (226 lb)   SpO2 96%   BMI 35 40 kg/m²          Physical Exam  Constitutional:       Appearance: Normal appearance  Cardiovascular:      Rate and Rhythm: Normal rate and regular rhythm  Pulmonary:      Effort: Pulmonary effort is normal       Breath sounds: Normal breath sounds  Abdominal:      General: Abdomen is flat  Tenderness: There is no abdominal tenderness  Musculoskeletal:      Cervical back: Tenderness present  No bony tenderness  Pain with movement present   Decreased range of " motion  Neurological:      Mental Status: She is alert             Trevon Bobby MD

## 2023-05-03 ENCOUNTER — PATIENT MESSAGE (OUTPATIENT)
Dept: FAMILY MEDICINE CLINIC | Facility: CLINIC | Age: 53
End: 2023-05-03

## 2023-05-03 ENCOUNTER — TELEPHONE (OUTPATIENT)
Dept: GASTROENTEROLOGY | Facility: CLINIC | Age: 53
End: 2023-05-03

## 2023-05-03 ENCOUNTER — PREP FOR PROCEDURE (OUTPATIENT)
Dept: GASTROENTEROLOGY | Facility: MEDICAL CENTER | Age: 53
End: 2023-05-03

## 2023-05-03 ENCOUNTER — TELEPHONE (OUTPATIENT)
Dept: GASTROENTEROLOGY | Facility: MEDICAL CENTER | Age: 53
End: 2023-05-03

## 2023-05-03 DIAGNOSIS — B18.2 CHRONIC HEPATITIS C WITHOUT HEPATIC COMA (HCC): Primary | ICD-10-CM

## 2023-05-03 DIAGNOSIS — R19.5 POSITIVE COLORECTAL CANCER SCREENING USING COLOGUARD TEST: Primary | ICD-10-CM

## 2023-05-03 RX ORDER — POLYETHYLENE GLYCOL 3350, SODIUM SULFATE ANHYDROUS, SODIUM BICARBONATE, SODIUM CHLORIDE, POTASSIUM CHLORIDE 236; 22.74; 6.74; 5.86; 2.97 G/4L; G/4L; G/4L; G/4L; G/4L
4000 POWDER, FOR SOLUTION ORAL ONCE
Qty: 4000 ML | Refills: 0 | Status: SHIPPED | OUTPATIENT
Start: 2023-05-03 | End: 2023-05-03

## 2023-05-03 NOTE — TELEPHONE ENCOUNTER
Mavyret 8 week script entered    Treatment  Naive  Viral load  [id number]  Genotype  1a  Elastography  F0-F1    HBV surf ab <3  VACCINE RECOMMENDED

## 2023-05-03 NOTE — TELEPHONE ENCOUNTER
Patient returning your call  I was not sure exactly what you wanted to be done  Please advise   Explained to patient that Dr Aiden San was in with another patient and I would get back to her as soon as I heard from him   Thank you

## 2023-05-03 NOTE — TELEPHONE ENCOUNTER
Patient is aware Cathleen Kayser authorization is in process  Hepatitis c treatment options and medication authorization process discussed  Patient is scheduled for hep a/b vaccine on 5/15/23    Reinforced preventing pregnancy during treatment  -tubal

## 2023-05-03 NOTE — TELEPHONE ENCOUNTER
I spoke with patient by phone and discussed her positive Cologuard  She is agreeable to colonoscopy  I have ordered colonoscopy with Golytely prep at [facility]  Please schedule her within 2 months ideally given the need to rule out cancer      SHIRIN Nichols

## 2023-05-03 NOTE — TELEPHONE ENCOUNTER
CT lung screening was approved for [facility]  Auth will be scanned into chart once received  Sent [employer] message to patient to advise

## 2023-05-08 ENCOUNTER — CONSULT (OUTPATIENT)
Dept: CARDIOLOGY CLINIC | Facility: CLINIC | Age: 53
End: 2023-05-08

## 2023-05-08 VITALS
WEIGHT: 228 LBS | BODY MASS INDEX: 35.71 KG/M2 | SYSTOLIC BLOOD PRESSURE: 120 MMHG | DIASTOLIC BLOOD PRESSURE: 80 MMHG | HEART RATE: 73 BPM

## 2023-05-08 DIAGNOSIS — R07.89 CHEST TIGHTNESS: ICD-10-CM

## 2023-05-08 DIAGNOSIS — R06.09 DYSPNEA ON EXERTION: Primary | ICD-10-CM

## 2023-05-08 DIAGNOSIS — I47.1 SUPRAVENTRICULAR TACHYCARDIA (HCC): ICD-10-CM

## 2023-05-08 NOTE — PROGRESS NOTES
Cardiology Consultation   MD Cindy Munguia MD, Nuria Wooten DO, MARCELO William MD Aline Rubins, DO, Beth Cunningham DO, [facility]  -------------------------------------------------------------------  [facility]  [address], [place] [id number] [phone]  [phone]  05/08/23  Tu Edmonds  YOB: 1970   MRN: [id number]      Referring Physician: Sarah Mcgraw MD  [address] [place] [id number]     HPI:  I am seeing this patient in cardiology consultation for:  Palpitations, SVT    Tu Edmonds is a 45 y o  female with:   SVT  Syncope  Chronic tobacco use  Ankylosing spondylitis  Hep C    Tobacco: current tobacco use > 40 pack years - 2 PPD smoking x >40 years  Alcohol: once to twice a year  Drugs: none  Caffeine- 2-3 cups / day     Family History: no premature CAD or SCD    She presents today for evaluation  She states she has a longstanding history of SVT, has been having symptoms more frequently recently  In the past she states that she has had episodes where she has had the tachycardia and has passed out because of it requiring trips to the emergency department  This has not happened like this in several years but she states more recently she has been having symptoms more frequently with the palpitations and tachycardia  She is interested in further options today  She also notes that she has developed significant dyspnea with exertion especially with going up steps to the point where she has to stop and catch her breath over the course of 5 to 10 minutes after just going up a flight of steps  She has a longstanding history of smoking 2 packs/day for over 40 years  With the shortness of breath she does get some chest pressure as well    Review of Systems   Constitutional: Negative for chills and fever     HENT: Negative for facial swelling and sore throat  Eyes: Negative for visual disturbance  Respiratory: Negative for cough, chest tightness, shortness of breath and wheezing  Cardiovascular: Positive for palpitations  Negative for chest pain and leg swelling  Gastrointestinal: Negative for abdominal pain, blood in stool, constipation, diarrhea, nausea and vomiting  Endocrine: Negative for cold intolerance and heat intolerance  Genitourinary: Negative for decreased urine volume, difficulty urinating, dysuria and hematuria  Musculoskeletal: Negative for arthralgias, back pain and myalgias  Skin: Negative for rash  Neurological: Positive for syncope  Negative for dizziness, weakness and numbness  Psychiatric/Behavioral: Negative for agitation, behavioral problems and confusion  The patient is not nervous/anxious  OBJECTIVE  Vitals:    05/08/23 1532   BP: 120/80   Pulse: 73       Physical Exam   Constitutional: awake, alert and oriented, in no acute distress, no obvious deformities, obese female  Head: Normocephalic, without obvious abnormality, atraumatic  Eyes: conjunctivae clear and moist  Sclera anicteric  No xanthelasmas  Pupils equal bilaterally  Extraocular motions are full  Ear nose mouth and throat: ears are symmetrical bilaterally, hearing appears to be equal bilaterally, no nasal discharge or epistaxis, oropharynx is clear with moist mucous membranes  Neck: Trachea is midline, neck is supple, no thyromegaly or significant lymphadenopathy, there is full range of motion    Lungs: clear to auscultation bilaterally, no wheezes, no rales, no rhonchi, no accessory muscle use, breathing is nonlabored  Heart: regular rate and rhythm, S1, S2 normal, no murmur, no click, no rub and no gallop, no lower extremity edema  Abdomen: Obese, soft, non-tender; bowel sounds normal; no masses, no organomegaly  Psychiatric: Patient is oriented to time, place, person, mood/affect is negative for depression, anxiety, agitation, appears to have appropriate insight  Skin: Skin is warm, dry, intact  No obvious rashes or lesions on exposed extremities  Nail beds are pink with no cyanosis or clubbing  EKG:  Results for orders placed or performed in visit on 05/08/23   POCT ECG    Impression    Normal sinus rhythm, normal ECG        The 10-year ASCVD risk score (Liv BORDEN, et al , 2019) is: 3%    Values used to calculate the score:      Age: 45 years      Sex: Female      Is Non- : No      Diabetic: No      Tobacco smoker: Yes      Systolic Blood Pressure: 409 mmHg      Is BP treated: No      HDL Cholesterol: 69 mg/dL      Total Cholesterol: 190 mg/dL    IMPRESSION:  SVT  Dyspnea on exertion, chest pressure with exertion  Syncope setting of SVT  Chronic tobacco use  Ankylosing spondylitis  Hep C    DISCUSSION/RECOMMENDATIONS:  She is having more frequent symptoms of her SVT at this time  Is been quite sometime since he had a echocardiogram or any type of ischemic evaluation  We will check echo given her SVT history now with more frequent symptoms, rule out underlying structural heart disease as the cause and plan to refer to EP for further evaluation and consideration for ablation  She was on metoprolol in the past and she states that this did not help her symptoms and caused her to have low blood pressure for which she was symptomatic from  She has developed worsening shortness of breath to the point where going up 1 flight of steps she has to stop and catch her breath for 5 to 10 minutes  She has a extensive smoking history of about 80 pack years where she smoked 2 packs/day for 40 years and continues to smoke  While her ASCVD risk calculates to be low based on her lipid panel, given her extensive smoking history and the development of the symptoms she could certainly have underlying coronary artery disease    We will plan for further evaluation with exercise stress test   I will follow-up with her after the echo and stress test and evaluation with electrophysiology  Huy Gupta DO, [facility], [address]  --------------------------------------------------------------------------------  TREADMILL STRESS  No results found for this or any previous visit      ----------------------------------------------------------------------------------------------  NUCLEAR STRESS TEST: No results found for this or any previous visit  No results found for this or any previous visit       --------------------------------------------------------------------------------  CATH:  No results found for this or any previous visit     --------------------------------------------------------------------------------  ECHO:   No results found for this or any previous visit  No results found for this or any previous visit     --------------------------------------------------------------------------------  HOLTER  No results found for this or any previous visit     --------------------------------------------------------------------------------  CAROTIDS  No results found for this or any previous visit  Diagnoses and all orders for this visit:    Dyspnea on exertion  -     Stress test only, exercise; Future    Supraventricular tachycardia ([address] )  -     Ambulatory Referral to Cardiology  -     POCT ECG  -     Ambulatory referral to Cardiac Electrophysiology; Future  -     Echo complete w/ contrast if indicated; Future    Chest tightness  -     Stress test only, exercise;  Future     ======================================================    Past Medical History:   Diagnosis Date   • Hepatitis C      Past Surgical History:   Procedure Laterality Date   • ANKLE SURGERY Right    • TUBAL LIGATION           Medications  Current Outpatient Medications   Medication Sig Dispense Refill   • Glecaprevir-Pibrentasvir 100-40 MG TABS Take 3 tablets by mouth in the morning 84 tablet 1   • Lidocaine 4 % PTCH Apply topically     • meloxicam (MOBIC) 15 mg tablet Take 15 mg by mouth daily     • methocarbamol (ROBAXIN) 750 mg tablet Take 750 mg by mouth 4 (four) times a day as needed     • nicotine (NICODERM CQ) 21 mg/24 hr TD 24 hr patch Place 1 patch on the skin over 24 hours every 24 hours 28 patch 1   • nicotine polacrilex (COMMIT) 2 MG lozenge Apply 1 lozenge (2 mg total) to the mouth or throat as needed for smoking cessation 100 each 1   • metoprolol succinate (TOPROL-XL) 25 mg 24 hr tablet Take 1 tablet (25 mg total) by mouth daily (Patient not taking: Reported on 5/8/2023) 30 tablet 2   • polyethylene glycol (Golytely) 4000 mL solution Take 4,000 mL by mouth once for 1 dose Take 4000 mL by mouth once for 1 dose  Use as directed 4000 mL 0     No current facility-administered medications for this visit          Allergies   Allergen Reactions   • Bactrim [Sulfamethoxazole-Trimethoprim] Rash       Social History     Socioeconomic History   • Marital status: /Civil Union     Spouse name: Not on file   • Number of children: Not on file   • Years of education: Not on file   • Highest education level: Not on file   Occupational History   • Not on file   Tobacco Use   • Smoking status: Every Day     Packs/day: 2 00     Types: Cigarettes   • Smokeless tobacco: Never   Vaping Use   • Vaping Use: Every day   Substance and Sexual Activity   • Alcohol use: Not Currently   • Drug use: Never   • Sexual activity: Not on file   Other Topics Concern   • Not on file   Social History Narrative   • Not on file     Social Determinants of Health     Financial Resource Strain: Not on file   Food Insecurity: Not on file   Transportation Needs: Not on file   Physical Activity: Not on file   Stress: Not on file   Social Connections: Not on file   Intimate Partner Violence: Not on file   Housing Stability: Not on file        Family History   Problem Relation Age of Onset   • Hypertension Mother    • Stomach cancer Maternal Grandmother    • Alzheimer's disease Maternal Grandfather        Lab "Results   Component Value Date    WBC 9 53 04/10/2023    HGB 15 5 (H) 04/10/2023    HCT 47 4 (H) 04/10/2023    MCV 97 04/10/2023     04/10/2023      Lab Results   Component Value Date    SODIUM 138 04/10/2023    K 4 7 04/10/2023     (H) 04/10/2023    CO2 27 04/10/2023    BUN 11 04/10/2023    CREATININE 0 80 04/10/2023    GLUC 97 05/05/2020    CALCIUM 9 8 04/10/2023      Lab Results   Component Value Date    HGBA1C 5 2 04/10/2023      No results found for: CHOL  Lab Results   Component Value Date    HDL 69 04/10/2023     Lab Results   Component Value Date    LDLCALC 107 (H) 04/10/2023     Lab Results   Component Value Date    TRIG 68 04/10/2023     No results found for: [place], [place]   Lab Results   Component Value Date    INR 0 86 04/18/2023    PROTIME 12 0 04/18/2023          Patient Active Problem List    Diagnosis Date Noted   • Neck pain 05/02/2023   • Positive colorectal cancer screening using Cologuard test 05/02/2023   • Smoking greater than 40 pack years 05/02/2023   • Spondylolisthesis of lumbar region 05/01/2023   • Ankylosing spondylitis of multiple sites in spine ([facility] ) 04/07/2023   • Syncope 05/11/2020   • Vitamin D deficiency 05/11/2020   • Chronic hepatitis C virus infection ([facility] [address] ) 11/20/2013   • [facility] 06/22/2012       Portions of the record may have been created with voice recognition software  Occasional wrong word or \"sound a like\" substitutions may have occurred due to the inherent limitations of voice recognition software  Read the chart carefully and recognize, using context, where substitutions have occurred      Manuel Tomlinson DO, [facility]  5/8/2023 4:16 PM          "

## 2023-05-08 NOTE — TELEPHONE ENCOUNTER
Spoke to patient and scheduled for 07/17/2023 with Dr Chago [facility]  Golytely prep was sent to pharmacy

## 2023-05-10 ENCOUNTER — TELEPHONE (OUTPATIENT)
Dept: FAMILY MEDICINE CLINIC | Facility: CLINIC | Age: 53
End: 2023-05-10

## 2023-05-11 ENCOUNTER — TELEPHONE (OUTPATIENT)
Dept: GASTROENTEROLOGY | Facility: CLINIC | Age: 53
End: 2023-05-11

## 2023-05-11 DIAGNOSIS — B18.2 CHRONIC HEPATITIS C WITHOUT HEPATIC COMA (HCC): Primary | ICD-10-CM

## 2023-05-11 NOTE — TELEPHONE ENCOUNTER
Patients GI provider:  Dr Stephenie Ladd    Number to return call: [phone]    Reason for call: [facility] calling stating prior auth for Reinaldo Haq was approved  Approval dates 5/6/23-5/6/24      Scheduled procedure/appointment date if applicable: Procedure 3/04/90

## 2023-05-15 NOTE — PROGRESS NOTES
A/P    1  Annual exam    Last PAP - 25 years ago    Next due today with co testing   Scheduling of pap discussed in detail      Mammogram - last 3/14/23- #   Next do 2024   Self breast exams discussed     Colonoscopy - 4/24/23- Cologuard was positive    Referral to GI made Dr Gustavo Anglin       2  Skin tags   They are growing and getting bigger    They sometimes peel off and return    They cause pain    Referral to derm       52 y o ,No LMP recorded  Patient is postmenopausal   C/O no gyn complaints only the skin tags      Past medical / social / surgical / family history reviewed and updated   Medication and allergies discussed in detail and updated     Review of Systems - Negative except detailed above      Physical Exam  Vitals reviewed  Constitutional:       Appearance: She is well-developed  Neck:      Thyroid: No thyromegaly  Cardiovascular:      Rate and Rhythm: Normal rate  Heart sounds: Normal heart sounds  Pulmonary:      Effort: Pulmonary effort is normal  No accessory muscle usage or respiratory distress  Chest:      Chest wall: No tenderness  Breasts:     Breasts are symmetrical       Right: No inverted nipple, mass or tenderness  Left: No inverted nipple, mass or tenderness  Abdominal:      General: There is no distension  Palpations: Abdomen is soft  Tenderness: There is no abdominal tenderness  There is no guarding or rebound  Genitourinary:     Exam position: Supine  Labia:         Right: No rash, tenderness, lesion or injury  Left: No rash, tenderness, lesion or injury  Vagina: Normal  No foreign body  No vaginal discharge or bleeding  Cervix: No cervical motion tenderness or discharge  Uterus: Not enlarged and not fixed  Adnexa:         Right: No mass, tenderness or fullness  Left: No mass, tenderness or fullness  Rectum: No external hemorrhoid  Musculoskeletal:      Cervical back: Normal range of motion  Lymphadenopathy:      Cervical: No cervical adenopathy  Upper Body:      Right upper body: No supraclavicular adenopathy  Left upper body: No supraclavicular adenopathy  Skin:            Comments: 3cm skin tag on the mons irregular border and raised dark        2 cm lighter skin tag on the bag / interior thigh raised and irregular borders  Neurological:      Mental Status: She is alert and oriented to person, place, and time  Psychiatric:         Speech: Speech normal          Behavior: Behavior normal          Thought Content:  Thought content normal          Judgment: Judgment normal

## 2023-05-16 NOTE — TELEPHONE ENCOUNTER
Patient is scheduled to receive Mavyret delivery and will start treatment on 522/23    Education initiated -dose, missed dose, side effects and required blood work       4 wk 6/19/23    SVR  10/9/23

## 2023-05-16 NOTE — TELEPHONE ENCOUNTER
Patient called in stating she is supposed to get the [facility] on Thursdays  states she was supposed to contact office prior to taking medication  Pt requesting call back

## 2023-05-19 ENCOUNTER — CLINICAL SUPPORT (OUTPATIENT)
Dept: FAMILY MEDICINE CLINIC | Facility: CLINIC | Age: 53
End: 2023-05-19

## 2023-05-19 ENCOUNTER — OFFICE VISIT (OUTPATIENT)
Dept: OBGYN CLINIC | Facility: MEDICAL CENTER | Age: 53
End: 2023-05-19

## 2023-05-19 VITALS — WEIGHT: 226.9 LBS | BODY MASS INDEX: 35.61 KG/M2 | HEIGHT: 67 IN

## 2023-05-19 VITALS — TEMPERATURE: 97.6 F

## 2023-05-19 DIAGNOSIS — Q82.8 ACCESSORY SKIN TAGS: ICD-10-CM

## 2023-05-19 DIAGNOSIS — Z23 ENCOUNTER FOR IMMUNIZATION: Primary | ICD-10-CM

## 2023-05-19 DIAGNOSIS — Z01.419 WOMEN'S ANNUAL ROUTINE GYNECOLOGICAL EXAMINATION: Primary | ICD-10-CM

## 2023-05-19 DIAGNOSIS — Z12.4 CERVICAL CANCER SCREENING: ICD-10-CM

## 2023-05-23 LAB
HPV HR 12 DNA CVX QL NAA+PROBE: NEGATIVE
HPV16 DNA CVX QL NAA+PROBE: NEGATIVE
HPV18 DNA CVX QL NAA+PROBE: NEGATIVE

## 2023-05-26 DIAGNOSIS — K59.00 CONSTIPATION, UNSPECIFIED CONSTIPATION TYPE: Primary | ICD-10-CM

## 2023-05-26 LAB
LAB AP GYN PRIMARY INTERPRETATION: NORMAL
Lab: NORMAL

## 2023-05-26 RX ORDER — POLYETHYLENE GLYCOL 3350 17 G/17G
17 POWDER, FOR SOLUTION ORAL DAILY
Qty: 850 G | Refills: 2 | Status: SHIPPED | OUTPATIENT
Start: 2023-05-26

## 2023-06-10 ENCOUNTER — APPOINTMENT (OUTPATIENT)
Dept: LAB | Facility: MEDICAL CENTER | Age: 53
End: 2023-06-10
Payer: COMMERCIAL

## 2023-06-10 DIAGNOSIS — B18.2 CHRONIC HEPATITIS C WITHOUT HEPATIC COMA (HCC): ICD-10-CM

## 2023-06-10 LAB
ALBUMIN SERPL BCP-MCNC: 3.6 G/DL (ref 3.5–5)
ALP SERPL-CCNC: 99 U/L (ref 46–116)
ALT SERPL W P-5'-P-CCNC: 19 U/L (ref 12–78)
ANION GAP SERPL CALCULATED.3IONS-SCNC: 2 MMOL/L (ref 4–13)
AST SERPL W P-5'-P-CCNC: 16 U/L (ref 5–45)
BASOPHILS # BLD AUTO: 0.06 THOUSANDS/ÂΜL (ref 0–0.1)
BASOPHILS NFR BLD AUTO: 1 % (ref 0–1)
BILIRUB SERPL-MCNC: 0.27 MG/DL (ref 0.2–1)
BUN SERPL-MCNC: 15 MG/DL (ref 5–25)
CALCIUM SERPL-MCNC: 10.2 MG/DL (ref 8.3–10.1)
CHLORIDE SERPL-SCNC: 112 MMOL/L (ref 96–108)
CO2 SERPL-SCNC: 25 MMOL/L (ref 21–32)
CREAT SERPL-MCNC: 0.82 MG/DL (ref 0.6–1.3)
EOSINOPHIL # BLD AUTO: 0.49 THOUSAND/ÂΜL (ref 0–0.61)
EOSINOPHIL NFR BLD AUTO: 5 % (ref 0–6)
ERYTHROCYTE [DISTWIDTH] IN BLOOD BY AUTOMATED COUNT: 13 % (ref 11.6–15.1)
GFR SERPL CREATININE-BSD FRML MDRD: 81 ML/MIN/1.73SQ M
GLUCOSE P FAST SERPL-MCNC: 109 MG/DL (ref 65–99)
HCT VFR BLD AUTO: 44.2 % (ref 34.8–46.1)
HGB BLD-MCNC: 14.6 G/DL (ref 11.5–15.4)
IMM GRANULOCYTES # BLD AUTO: 0.03 THOUSAND/UL (ref 0–0.2)
IMM GRANULOCYTES NFR BLD AUTO: 0 % (ref 0–2)
LYMPHOCYTES # BLD AUTO: 2.59 THOUSANDS/ÂΜL (ref 0.6–4.47)
LYMPHOCYTES NFR BLD AUTO: 24 % (ref 14–44)
MCH RBC QN AUTO: 31.7 PG (ref 26.8–34.3)
MCHC RBC AUTO-ENTMCNC: 33 G/DL (ref 31.4–37.4)
MCV RBC AUTO: 96 FL (ref 82–98)
MONOCYTES # BLD AUTO: 0.84 THOUSAND/ÂΜL (ref 0.17–1.22)
MONOCYTES NFR BLD AUTO: 8 % (ref 4–12)
NEUTROPHILS # BLD AUTO: 6.96 THOUSANDS/ÂΜL (ref 1.85–7.62)
NEUTS SEG NFR BLD AUTO: 62 % (ref 43–75)
NRBC BLD AUTO-RTO: 0 /100 WBCS
PLATELET # BLD AUTO: 205 THOUSANDS/UL (ref 149–390)
PMV BLD AUTO: 13.3 FL (ref 8.9–12.7)
POTASSIUM SERPL-SCNC: 4.3 MMOL/L (ref 3.5–5.3)
PROT SERPL-MCNC: 7.1 G/DL (ref 6.4–8.4)
RBC # BLD AUTO: 4.61 MILLION/UL (ref 3.81–5.12)
SODIUM SERPL-SCNC: 139 MMOL/L (ref 135–147)
WBC # BLD AUTO: 10.97 THOUSAND/UL (ref 4.31–10.16)

## 2023-06-10 PROCEDURE — 87522 HEPATITIS C REVRS TRNSCRPJ: CPT

## 2023-06-10 PROCEDURE — 85025 COMPLETE CBC W/AUTO DIFF WBC: CPT

## 2023-06-10 PROCEDURE — 36415 COLL VENOUS BLD VENIPUNCTURE: CPT

## 2023-06-10 PROCEDURE — 80053 COMPREHEN METABOLIC PANEL: CPT

## 2023-06-14 ENCOUNTER — OFFICE VISIT (OUTPATIENT)
Dept: FAMILY MEDICINE CLINIC | Facility: CLINIC | Age: 53
End: 2023-06-14
Payer: COMMERCIAL

## 2023-06-14 ENCOUNTER — TELEPHONE (OUTPATIENT)
Dept: DERMATOLOGY | Facility: CLINIC | Age: 53
End: 2023-06-14

## 2023-06-14 VITALS
SYSTOLIC BLOOD PRESSURE: 130 MMHG | DIASTOLIC BLOOD PRESSURE: 82 MMHG | WEIGHT: 225.8 LBS | HEART RATE: 92 BPM | HEIGHT: 67 IN | OXYGEN SATURATION: 97 % | BODY MASS INDEX: 35.44 KG/M2 | TEMPERATURE: 97.5 F

## 2023-06-14 DIAGNOSIS — L98.9 SKIN LESION: ICD-10-CM

## 2023-06-14 DIAGNOSIS — R39.89 SENSATION OF PRESSURE IN BLADDER AREA: Primary | ICD-10-CM

## 2023-06-14 DIAGNOSIS — R35.0 FREQUENT URINATION: ICD-10-CM

## 2023-06-14 DIAGNOSIS — R00.0 TACHYCARDIA: ICD-10-CM

## 2023-06-14 LAB
BACTERIA UR QL AUTO: NORMAL /HPF
BILIRUB UR QL STRIP: NEGATIVE
CLARITY UR: CLEAR
COLOR UR: YELLOW
GLUCOSE UR STRIP-MCNC: NEGATIVE MG/DL
HCV RNA SERPL NAA+PROBE-ACNC: <15 IU/ML
HGB UR QL STRIP.AUTO: NEGATIVE
KETONES UR STRIP-MCNC: NEGATIVE MG/DL
LEUKOCYTE ESTERASE UR QL STRIP: NEGATIVE
NITRITE UR QL STRIP: NEGATIVE
NON-SQ EPI CELLS URNS QL MICRO: NORMAL /HPF
PH UR STRIP.AUTO: 6.5 [PH]
PROT UR STRIP-MCNC: ABNORMAL MG/DL
RBC #/AREA URNS AUTO: NORMAL /HPF
SL AMB  POCT GLUCOSE, UA: ABNORMAL
SL AMB LEUKOCYTE ESTERASE,UA: ABNORMAL
SL AMB POCT BILIRUBIN,UA: ABNORMAL
SL AMB POCT BLOOD,UA: ABNORMAL
SL AMB POCT CLARITY,UA: CLEAR
SL AMB POCT COLOR,UA: YELLOW
SL AMB POCT KETONES,UA: ABNORMAL
SL AMB POCT NITRITE,UA: ABNORMAL
SL AMB POCT PH,UA: 5
SL AMB POCT SPECIFIC GRAVITY,UA: 1015
SL AMB POCT URINE PROTEIN: ABNORMAL
SL AMB POCT UROBILINOGEN: ABNORMAL
SP GR UR STRIP.AUTO: 1.02 (ref 1–1.03)
TEST INFORMATION: NORMAL
UROBILINOGEN UR STRIP-ACNC: <2 MG/DL
WBC #/AREA URNS AUTO: NORMAL /HPF

## 2023-06-14 PROCEDURE — 99214 OFFICE O/P EST MOD 30 MIN: CPT | Performed by: FAMILY MEDICINE

## 2023-06-14 PROCEDURE — 87086 URINE CULTURE/COLONY COUNT: CPT | Performed by: FAMILY MEDICINE

## 2023-06-14 PROCEDURE — 81001 URINALYSIS AUTO W/SCOPE: CPT | Performed by: FAMILY MEDICINE

## 2023-06-14 PROCEDURE — 81002 URINALYSIS NONAUTO W/O SCOPE: CPT | Performed by: FAMILY MEDICINE

## 2023-06-14 NOTE — TELEPHONE ENCOUNTER
Rec'd call from patient requesting new patient appt for lesion on her leg  Explained wait list process  Patient verbalized understanding  Created wait list for patient for [facility]

## 2023-06-14 NOTE — PATIENT INSTRUCTIONS
Increase water intake  Can also drink cranberry juice  Wait for culture  If culture is negative and symptoms persist/worsen - let dr Radha Chavez know

## 2023-06-14 NOTE — PROGRESS NOTES
FAMILY PRACTICE OFFICE VISIT    NAME: Sandy Pardo    AGE: 45 y o  SEX: female  : 1970   MRN: [id number]    DATE: 2023  TIME: 11:34 AM    Assessment and Plan   There are no Patient Instructions on file for this visit  1  Frequent urination  Check urine today  UA showing some ketones and protein  Await culture prior to beginning antibiotic - to determine if appropriate  Increase fluid intake    Creatinine normal 6/10/2023 - 0 82 with GFR - 81      2  Sensation of pressure in bladder area    - POCT urine dip    3  Skin lesion  Refer to derm      4  Tachycardia  Pt denies symptoms  Repeat pulse did trend down a little  Suspect pt was a little nervous about visit    Maintain hydration    Chief Complaint     Chief Complaint   Patient presents with   • Urinary Tract Infection     Achy in kidney area  and fullness in bladder        History of Present Illness   Sandy Pardo is a 45y o -year-old female who presents today with possible UTI  Started with symptoms X 5 days ago - aching in lower back  No h/o uti's  Symptoms persisted  And then a few days ago - pt feels pressure and urgency in bladder  Not a lot of urine coming out  Has not taken anything otc  Review of Systems   Review of Systems   Constitutional: Positive for diaphoresis  Negative for fever  Respiratory:        Pt is a smoker     Gastrointestinal: Positive for nausea  Chronic nausea - pt does not think it is anything new  Stools are normal in color     Genitourinary: Positive for urgency  Negative for hematuria and vaginal bleeding  Feeling like she needs to force urine out  No h/o renal calculi  No longer getting menses    Urinary pressure  Has some associate back pain  Questionable flank pain  Urine Is clear         Active Problem List     Patient Active Problem List   Diagnosis   • Syncope   • Vitamin D deficiency   • Chronic hepatitis C virus infection ([facility] [address] )   • Supraventricular tachycardia ([facility] [address] )   • Ankylosing spondylitis of multiple sites in spine ([facility] [address] )   • Spondylolisthesis of lumbar region   • Neck pain   • Positive colorectal cancer screening using Cologuard test   • Smoking greater than 40 pack years         Past Medical History:  Past Medical History:   Diagnosis Date   • Hepatitis C        Past Surgical History:  Past Surgical History:   Procedure Laterality Date   • ANKLE SURGERY Right    • TUBAL LIGATION         Family History:  Family History   Problem Relation Age of Onset   • Hypertension Mother    • Stomach cancer Maternal Grandmother    • Alzheimer's disease Maternal Grandfather        Social History:  Social History     Socioeconomic History   • Marital status: /Civil Union     Spouse name: Not on file   • Number of children: Not on file   • Years of education: Not on file   • Highest education level: Not on file   Occupational History   • Not on file   Tobacco Use   • Smoking status: Every Day     Packs/day: 2 00     Types: Cigarettes   • Smokeless tobacco: Never   Vaping Use   • Vaping Use: Every day   Substance and Sexual Activity   • Alcohol use: Not Currently   • Drug use: Never   • Sexual activity: Not Currently     Partners: Male   Other Topics Concern   • Not on file   Social History Narrative   • Not on file     Social Determinants of Health     Financial Resource Strain: Not on file   Food Insecurity: Not on file   Transportation Needs: Not on file   Physical Activity: Not on file   Stress: Not on file   Social Connections: Not on file   Intimate Partner Violence: Not on file   Housing Stability: Not on file       Objective     Vitals:    06/14/23 1130   BP: 130/82   Pulse: (!) 109   Temp: 97 5 °F (36 4 °C)   SpO2: 97%     Wt Readings from Last 3 Encounters:   06/14/23 102 kg (225 lb 12 8 oz)   05/19/23 103 kg (226 lb 14 4 oz)   05/08/23 103 kg (228 lb)       Physical Exam  Vitals and nursing note reviewed  Constitutional:       General: She is not in acute distress  "Appearance: Normal appearance  She is not toxic-appearing  Comments: Slightly sweaty     Cardiovascular:      Rate and Rhythm: Regular rhythm  Tachycardia present  Heart sounds: Normal heart sounds  No murmur heard  Pulmonary:      Effort: Pulmonary effort is normal  No respiratory distress  Breath sounds: Normal breath sounds  No wheezing, rhonchi or rales  Abdominal:      General: There is no distension  Palpations: Abdomen is soft  There is no mass  Tenderness: There is abdominal tenderness  There is no guarding or rebound  Comments: Mild suprapubic tenderness  Mild tenderness left CVA  But pt points to right lower posterior back as area of tenderness  No peritoneal signs     Skin:     Comments: Left lower ext raised and hyperkeratotic hyperpigmented lesion measuring about 5-6 mm in diameter - left lateral calf  Changing over the past 1-1 5 years  Neurological:      Mental Status: She is alert  Psychiatric:         Mood and Affect: Mood normal          Behavior: Behavior normal          Thought Content:  Thought content normal          Judgment: Judgment normal          Pertinent Laboratory/Diagnostic Studies:  Lab Results   Component Value Date    BUN 15 06/10/2023    CALCIUM 10 2 (H) 06/10/2023     (H) 06/10/2023    CO2 25 06/10/2023    CREATININE 0 82 06/10/2023    K 4 3 06/10/2023     Lab Results   Component Value Date    ALKPHOS 99 06/10/2023    ALT 19 06/10/2023    AST 16 06/10/2023    GGT 22 04/18/2023       Lab Results   Component Value Date    HCT 44 2 06/10/2023    HGB 14 6 06/10/2023    MCV 96 06/10/2023     06/10/2023    WBC 10 97 (H) 06/10/2023       No results found for: \"TSH\"    No results found for: \"CHOL\"  Lab Results   Component Value Date    TRIG 68 04/10/2023     Lab Results   Component Value Date    HDL 69 04/10/2023     Lab Results   Component Value Date    LDLCALC 107 (H) 04/10/2023     Lab Results   Component Value Date    HGBA1C 5 2 " 04/10/2023       Results for orders placed or performed in visit on 06/10/23   Hepatitis C RNA, quantitative, PCR   Result Value Ref Range    HCV PCR Quantitative <15 IU/mL    Test Information Comment    CBC and differential   Result Value Ref Range    WBC 10 97 (H) 4 31 - 10 16 Thousand/uL    RBC 4 61 3 81 - 5 12 Million/uL    Hemoglobin 14 6 11 5 - 15 4 g/dL    Hematocrit 44 2 34 8 - 46 1 %    MCV 96 82 - 98 fL    MCH 31 7 26 8 - 34 3 pg    MCHC 33 0 31 4 - 37 4 g/dL    RDW 13 0 11 6 - 15 1 %    MPV 13 3 (H) 8 9 - 12 7 fL    Platelets 481 525 - 133 Thousands/uL    nRBC 0 /100 WBCs    Neutrophils Relative 62 43 - 75 %    Immat GRANS % 0 0 - 2 %    Lymphocytes Relative 24 14 - 44 %    Monocytes Relative 8 4 - 12 %    Eosinophils Relative 5 0 - 6 %    Basophils Relative 1 0 - 1 %    Neutrophils Absolute 6 96 1 85 - 7 62 Thousands/µL    Immature Grans Absolute 0 03 0 00 - 0 20 Thousand/uL    Lymphocytes Absolute 2 59 0 60 - 4 47 Thousands/µL    Monocytes Absolute 0 84 0 17 - 1 22 Thousand/µL    Eosinophils Absolute 0 49 0 00 - 0 61 Thousand/µL    Basophils Absolute 0 06 0 00 - 0 10 Thousands/µL   Comprehensive metabolic panel   Result Value Ref Range    Sodium 139 135 - 147 mmol/L    Potassium 4 3 3 5 - 5 3 mmol/L    Chloride 112 (H) 96 - 108 mmol/L    CO2 25 21 - 32 mmol/L    ANION GAP 2 (L) 4 - 13 mmol/L    BUN 15 5 - 25 mg/dL    Creatinine 0 82 0 60 - 1 30 mg/dL    Glucose, Fasting 109 (H) 65 - 99 mg/dL    Calcium 10 2 (H) 8 3 - 10 1 mg/dL    AST 16 5 - 45 U/L    ALT 19 12 - 78 U/L    Alkaline Phosphatase 99 46 - 116 U/L    Total Protein 7 1 6 4 - 8 4 g/dL    Albumin 3 6 3 5 - 5 0 g/dL    Total Bilirubin 0 27 0 20 - 1 00 mg/dL    eGFR 81 ml/min/1 73sq m       Orders Placed This Encounter   Procedures   • POCT urine dip       ALLERGIES:  Allergies   Allergen Reactions   • Bactrim [Sulfamethoxazole-Trimethoprim] Rash       Current Medications     Current Outpatient Medications   Medication Sig Dispense Refill   • Glecaprevir-Pibrentasvir 100-40 MG TABS Take 3 tablets by mouth in the morning 84 tablet 1   • Lidocaine 4 % PTCH Apply topically (Patient not taking: Reported on 5/19/2023)     • meloxicam (MOBIC) 15 mg tablet Take 15 mg by mouth daily (Patient not taking: Reported on 6/14/2023)     • methocarbamol (ROBAXIN) 750 mg tablet Take 750 mg by mouth 4 (four) times a day as needed (Patient not taking: Reported on 6/14/2023)     • metoprolol succinate (TOPROL-XL) 25 mg 24 hr tablet Take 1 tablet (25 mg total) by mouth daily (Patient not taking: Reported on 5/8/2023) 30 tablet 2   • nicotine (NICODERM CQ) 21 mg/24 hr TD 24 hr patch Place 1 patch on the skin over 24 hours every 24 hours (Patient not taking: Reported on 6/14/2023) 28 patch 1   • nicotine polacrilex (COMMIT) 2 MG lozenge Apply 1 lozenge (2 mg total) to the mouth or throat as needed for smoking cessation (Patient not taking: Reported on 6/14/2023) 100 each 1   • polyethylene glycol (GLYCOLAX) 17 GM/SCOOP powder Take 17 g by mouth daily (Patient not taking: Reported on 6/14/2023) 850 g 2   • polyethylene glycol (Golytely) 4000 mL solution Take 4,000 mL by mouth once for 1 dose Take 4000 mL by mouth once for 1 dose  Use as directed 4000 mL 0     No current facility-administered medications for this visit           Health Maintenance     Health Maintenance   Topic Date Due   • Pneumococcal Vaccine: Pediatrics (0 to 5 Years) and At-Risk Patients (6 to 59 Years) (1 - PCV) Never done   • DTaP,Tdap,and Td Vaccines (1 - Tdap) Never done   • Hepatitis A Vaccine (2 of 2 - Risk 2-dose series) 10/02/2014   • PT PLAN OF CARE  05/11/2023   • COVID-19 Vaccine (1) 07/07/2023 (Originally 5/16/1971)   • Hepatitis B Vaccine (3 of 3 - Risk 3-dose series) 07/14/2023   • Breast Cancer Screening: Mammogram  03/14/2024   • Depression Screening  04/07/2024   • BMI: Followup Plan  04/07/2024   • BMI: Adult  05/19/2024   • Annual Physical  05/19/2024   • Colorectal Cancer Screening 04/24/2026   • Cervical Cancer Screening  05/19/2028   • HIV Screening  Completed   • Influenza Vaccine  Completed   • HIB Vaccine  Aged Out   • IPV Vaccine  Aged Out   • Meningococcal ACWY Vaccine  Aged Out   • HPV Vaccine  Aged Out   • Hepatitis C Screening  Discontinued     Immunization History   Administered Date(s) Administered   • Hep A, adult 04/02/2014   • Hep B, adult 04/02/2014, 05/19/2023   • INFLUENZA 12/08/2022   • Influenza, seasonal, injectable 10/15/2012, 11/22/2013          Polly Gonzalez DO

## 2023-06-15 DIAGNOSIS — B18.2 CHRONIC HEPATITIS C WITHOUT HEPATIC COMA (HCC): Primary | ICD-10-CM

## 2023-06-15 LAB — BACTERIA UR CULT: NORMAL

## 2023-06-15 NOTE — RESULT ENCOUNTER NOTE
Urine culture did come back and is negative  Please see previous message I sent about an hour ago  Thanks  Dr Kern Said

## 2023-06-15 NOTE — RESULT ENCOUNTER NOTE
Hi ehsan  Your urine shows trace protein only  I would like you to followup with dr Jackie Paz re: this finding  As he may want to repeat urine or do some more specific testing    If a culture comes back showing bacteria  I will let you know  - also - let us know if symptoms not improving/worsening  Thanks  Dr Bashir Lazaro

## 2023-06-22 DIAGNOSIS — M54.2 NECK PAIN: Primary | ICD-10-CM

## 2023-06-23 RX ORDER — MELOXICAM 15 MG/1
15 TABLET ORAL DAILY
Qty: 30 TABLET | Refills: 0 | Status: SHIPPED | OUTPATIENT
Start: 2023-06-23

## 2023-06-23 RX ORDER — METHOCARBAMOL 750 MG/1
750 TABLET, FILM COATED ORAL EVERY 6 HOURS PRN
Qty: 30 TABLET | Refills: 0 | Status: SHIPPED | OUTPATIENT
Start: 2023-06-23

## 2023-07-01 DIAGNOSIS — F17.200 SMOKING: ICD-10-CM

## 2023-07-01 DIAGNOSIS — Z23 ENCOUNTER FOR IMMUNIZATION: ICD-10-CM

## 2023-07-03 ENCOUNTER — TELEPHONE (OUTPATIENT)
Dept: GASTROENTEROLOGY | Facility: MEDICAL CENTER | Age: 53
End: 2023-07-03

## 2023-07-03 RX ORDER — NICOTINE 21 MG/24HR
1 PATCH, TRANSDERMAL 24 HOURS TRANSDERMAL EVERY 24 HOURS
Qty: 28 PATCH | Refills: 1 | Status: SHIPPED | OUTPATIENT
Start: 2023-07-03

## 2023-07-03 NOTE — TELEPHONE ENCOUNTER
Golytely is generally always covered isn't it? We can do Miralax prep, but it still costs money to  those medications over-the-counter.

## 2023-07-03 NOTE — TELEPHONE ENCOUNTER
Our mutual patient is scheduled for procedure: Colonoscopy     On: 7/17/2023     With: Dr. Kayla Guerrero office is requesting cardiac clearance for their upcoming procedure.       Physician Approving clearance: ________________________

## 2023-07-03 NOTE — LETTER
Cardiology Pre Operative Clearance      PRE OPERATIVE CARDIAC RISK ASSESSMENT    07/03/23    Aiyana Matthews  1970  [id number]    Date of Surgery: 07/17/2023    Type of Surgery: Colonoscopy    Surgeon:     Cardiac Contraindication Exists (see coments below)    Anticoagulation: not applicable    Physician Comment: patient seen with symptoms of worsening SOB, recommended for stress test - but workup not completed yet. Cardiac risk for procedure is elevated - likely at least moderate - with incomplete workup of her symptoms.     Electronically Signed: Negro Espitia DO, [facility]  Cardiovascular diseases

## 2023-07-03 NOTE — TELEPHONE ENCOUNTER
I called and spoke with patient to confirm 7/17 procedure. ASC questions asked. Patient unsure if she will be financially covered for procedure, but provided numbers for financial counselors. Patient stated that she will look into that option first prior to cancelling. Patient was originally prescribed Golytely for 7/17/2023 procedure. Patient requesting an alternative prep that is most cost effective.

## 2023-07-06 ENCOUNTER — TELEPHONE (OUTPATIENT)
Dept: GASTROENTEROLOGY | Facility: MEDICAL CENTER | Age: 53
End: 2023-07-06

## 2023-07-06 NOTE — TELEPHONE ENCOUNTER
Please see cardiology letter in Letters section from today. Patient is not cleared from cardiology standpoint and requires further testing including stress test before elective colonoscopy. Patient may need to be rescheduled if necessary cardiology work-up cannot be completed before the colonoscopy.

## 2023-07-06 NOTE — TELEPHONE ENCOUNTER
Left a message informing patient of cancelling 7/17/23 procedure in order to obtain cardiac workup first. Provided clinic number to reschedule once cardiac workup is completed.

## 2023-07-06 NOTE — PROGRESS NOTES
[facility] staff: Please see cardiology letter. She is not cleared for colonoscopy and should complete her cardiac work-up including stress test beforehand. Can we reschedule colonoscopy?

## 2023-07-06 NOTE — TELEPHONE ENCOUNTER
I left a message to cancel 7/17/23 procedure. I informed patient that cardiac workup is needed first. Provided number to reschedule when cardiac workup is completed.

## 2023-07-06 NOTE — TELEPHONE ENCOUNTER
----- Message from Armin Granados MD sent at 7/6/2023  9:38 AM EDT -----    Aram [facility] staff: Please see cardiology letter. She is not cleared for colonoscopy and should complete her cardiac work-up including stress test beforehand.  Can we reschedule colonoscopy?      ----- Message -----  From: Silvana Coley DO  Sent: 7/6/2023   8:30 AM EDT  To: Armin Granados MD

## 2023-07-25 DIAGNOSIS — M54.2 NECK PAIN: ICD-10-CM

## 2023-07-25 RX ORDER — MELOXICAM 15 MG/1
15 TABLET ORAL DAILY
Qty: 30 TABLET | Refills: 0 | Status: SHIPPED | OUTPATIENT
Start: 2023-07-25

## 2023-08-03 ENCOUNTER — TELEPHONE (OUTPATIENT)
Dept: CARDIOLOGY CLINIC | Facility: CLINIC | Age: 53
End: 2023-08-03

## 2023-08-03 NOTE — TELEPHONE ENCOUNTER
Called to lizzie new insurance info as [employer] has terminated as of 7/31/2023 left vm and also sent message thru [facility] Chart

## 2023-08-04 DIAGNOSIS — M54.2 NECK PAIN: ICD-10-CM

## 2023-08-08 RX ORDER — METHOCARBAMOL 750 MG/1
750 TABLET, FILM COATED ORAL EVERY 6 HOURS PRN
Qty: 30 TABLET | Refills: 0 | Status: SHIPPED | OUTPATIENT
Start: 2023-08-08

## 2023-08-11 ENCOUNTER — PATIENT MESSAGE (OUTPATIENT)
Dept: GASTROENTEROLOGY | Facility: MEDICAL CENTER | Age: 53
End: 2023-08-11

## 2023-08-11 DIAGNOSIS — R19.5 POSITIVE COLORECTAL CANCER SCREENING USING COLOGUARD TEST: Primary | ICD-10-CM

## 2023-08-23 ENCOUNTER — PATIENT OUTREACH (OUTPATIENT)
Dept: FAMILY MEDICINE CLINIC | Facility: CLINIC | Age: 53
End: 2023-08-23

## 2023-08-23 NOTE — PROGRESS NOTES
RACHEL BORJA received referral message indicating that the patient is facing possible eviction. Additionally, patient does not have insurance coverage and unable to afford OOP costs for needed testing/procedure(s). RACHEL BORJA placed call to the patient, Woodward Prader. Initially she did not want to explain details. She has not gone to court yet. She does not have representation. She did not pursue seeking a pubic defender. Woodward Prader explained that she is often told her spouse makes too much money and therefore they do not qualify for anything. RACHEL BORJA provided information for Verdie Fleischer stated that they first got behind on their rent since  lost his business last year. Her  got hired right away but could not start for 2 months and during that time their utilities and oil bills fell very far behind. At some point she was able to get all caught up $9000. However then her car needed repairs and the bills piled up again. She could not get assistance with oil due to her 's income. Their emergency SNAP and her MA got cut off. She is unable to afford any plans through [employer]. She spoke with the Public Utility Commission regarding her PPL account and they were given the plan of $500/mo to get caught up. Their budget is $300 and then they added an additional $200 for past due. Their rent is $2500/mo. They are only able to pay $1000/mo. At this time they half of July and all of August which is approximately $4000. Her spouse earns $1500/week which totals $6000/mo prior to taxes. They have two adult children living with them. Her one daughter is disabled and SSDI and her other daughter has a lot of health issues and has been getting denied SSDI. Woodward Prader stated she had a  and that she tried to go back to work in May but she was unable to continue due to back issues. 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  RACHEL BORJA explained can try to apply for SSI and that it may just be a lower income if approved. Ana María Tracy has been searching for a new place to rent, but her credit is not good now. Ana María Tracy expressed frustration in needing important medical appointments and testing/procedures but that she cannot afford them without insurance. She currently does not have any medical bills as she has been avoiding getting any done without insurance. She continued that she needs to get a colonoscopy as the doctors suspect cancer. She also needs to get a stress test to be cleared for some procedures. She also has a lesion on her leg that she cannot afford to be checked out. She feels she is deteriorating and there is noting she can do about it. RACHEL BORJA did provide supportive counseling. RACHEL BORJA explained Alejandro And Kamila Hudson and provided the number for [address] [place], [place] [address]ruddy  Counselor Department ([phone]) to further discuss. RACHEL BORJA explained that although proof of income is required they also request a letter of hardship and this is where she can explain why she does not have health coverage and about her financial burden if were to pay out of pocket for care. Ana María Tracy will call. Ana María Tracy is receptive to RACHEL BORJA sending food resources to help offset costs of other bills. Evonbrittnee Tracy is aware if has any trouble connecting with assistance can contact RACHEL BORJA directly. RACHEL BORJA will remain available for future psychosocial support as needed.

## 2023-08-23 NOTE — PROGRESS NOTES
Following conversation, RACHEL BORJA contacted [facility] and spoke with their cancer prevention program. They advised to contact the [email or web address] Program.

## 2023-08-24 ENCOUNTER — PATIENT OUTREACH (OUTPATIENT)
Dept: FAMILY MEDICINE CLINIC | Facility: CLINIC | Age: 53
End: 2023-08-24

## 2023-08-24 NOTE — PROGRESS NOTES
Patient was [facility] to [address], [employer], [address], [address] [facility],[address] [facility] ([employer]), [address]  Food Pantry for food     Patient was referred on [facility] to [address] [facility] by [employer], [employer]), and [place] for money and work

## 2023-08-25 ENCOUNTER — VBI (OUTPATIENT)
Dept: ADMINISTRATIVE | Facility: OTHER | Age: 53
End: 2023-08-25

## 2023-08-26 DIAGNOSIS — M54.2 NECK PAIN: ICD-10-CM

## 2023-08-26 RX ORDER — MELOXICAM 15 MG/1
15 TABLET ORAL DAILY
Qty: 30 TABLET | Refills: 0 | Status: SHIPPED | OUTPATIENT
Start: 2023-08-26

## 2023-09-30 DIAGNOSIS — M54.2 NECK PAIN: ICD-10-CM

## 2023-09-30 RX ORDER — MELOXICAM 15 MG/1
15 TABLET ORAL DAILY
Qty: 30 TABLET | Refills: 0 | Status: SHIPPED | OUTPATIENT
Start: 2023-09-30

## 2023-11-03 DIAGNOSIS — M54.2 NECK PAIN: ICD-10-CM

## 2023-11-03 RX ORDER — MELOXICAM 15 MG/1
15 TABLET ORAL DAILY
Qty: 30 TABLET | Refills: 0 | Status: SHIPPED | OUTPATIENT
Start: 2023-11-03

## 2023-11-10 ENCOUNTER — APPOINTMENT (OUTPATIENT)
Dept: LAB | Facility: MEDICAL CENTER | Age: 53
End: 2023-11-10

## 2023-11-10 DIAGNOSIS — B18.2 CHRONIC HEPATITIS C WITHOUT HEPATIC COMA (HCC): ICD-10-CM

## 2023-11-10 PROCEDURE — 87522 HEPATITIS C REVRS TRNSCRPJ: CPT

## 2023-11-10 PROCEDURE — 36415 COLL VENOUS BLD VENIPUNCTURE: CPT

## 2023-11-12 DIAGNOSIS — M54.2 NECK PAIN: ICD-10-CM

## 2023-11-13 LAB
HCV RNA SERPL NAA+PROBE-ACNC: NORMAL IU/ML
TEST INFORMATION: NORMAL

## 2023-11-13 RX ORDER — METHOCARBAMOL 750 MG/1
750 TABLET, FILM COATED ORAL EVERY 6 HOURS PRN
Qty: 30 TABLET | Refills: 0 | Status: SHIPPED | OUTPATIENT
Start: 2023-11-13

## 2023-11-13 NOTE — TELEPHONE ENCOUNTER
Patient informed. Awaiting insurance coverage before she schedules. Patient confirms speaking with SW/CM already after a referral was placed in 8/14/23.

## 2023-11-13 NOTE — TELEPHONE ENCOUNTER
Please call, I did send in a new prescription for methocarbamol muscle relaxant as she requested. She really needs follow-up regarding positive Cologuard, check if she has anything set up.   She also should have a follow-up in the office with Dr. Alonzo Hughes

## 2023-12-26 ENCOUNTER — TELEPHONE (OUTPATIENT)
Dept: RHEUMATOLOGY | Facility: CLINIC | Age: 53
End: 2023-12-26

## 2023-12-26 NOTE — TELEPHONE ENCOUNTER
Caller: Patient     Doctor: Kamari    Reason for call:     Patient received a call about her her on 24, the provider had to cancel.  I was trying to   Reschedule her but the system will not let me, message stating her referral .  She is getting new insurance thru her husbands emplyer on 24, it will be through [employer], but she does not have the new insurance information right now..  She needs a call to help to schedule an appointment.    Call back#: [phone]

## 2024-01-11 DIAGNOSIS — M54.2 NECK PAIN: ICD-10-CM

## 2024-01-11 DIAGNOSIS — I47.10 SUPRAVENTRICULAR TACHYCARDIA: ICD-10-CM

## 2024-01-11 DIAGNOSIS — R19.5 POSITIVE COLORECTAL CANCER SCREENING USING COLOGUARD TEST: Primary | ICD-10-CM

## 2024-01-11 RX ORDER — MELOXICAM 15 MG/1
15 TABLET ORAL DAILY
Qty: 30 TABLET | Refills: 0 | Status: SHIPPED | OUTPATIENT
Start: 2024-01-11

## 2024-01-12 RX ORDER — METHOCARBAMOL 750 MG/1
750 TABLET, FILM COATED ORAL EVERY 6 HOURS PRN
Qty: 30 TABLET | Refills: 0 | Status: SHIPPED | OUTPATIENT
Start: 2024-01-12

## 2024-01-16 ENCOUNTER — TELEPHONE (OUTPATIENT)
Dept: GASTROENTEROLOGY | Facility: MEDICAL CENTER | Age: 54
End: 2024-01-16

## 2024-01-16 NOTE — TELEPHONE ENCOUNTER
Left message to return call to schedule colonoscopy & that we will need her to get cardiac clearance as well.      ----- Message from Hernando Paulson MD sent at 1/16/2024 12:45 PM EST -----  Regarding: RE: Positive Cologuard  Thanks for the update!    GI office:  See forwarded message from Dr. Mares    This patient had a positive Cologuard in 4/2023 and needed cardiac clearance for colonoscopy. However, cardiology did not clear her at the time and wanted her to get a stress test. Unfortunately, patient did not have insurance and could not get these done.     It looks like she now has insurance, so please schedule her for colonoscopy ASAP after she completes her cardiology work-up and gets clearance.    Thanks,  Omkar       ----- Message -----  From: Ino Mares MD  Sent: 1/12/2024  12:33 PM EST  To: Hernando Paulson MD  Subject: Positive Cologuard                               Hello the above patient had a positive cologuard, you had seen her in the past, and we were unable to have the colonoscopy/cardiac work up due to insurance issues, I think she has insurance again, I placed a referral to your office and to cardiology.

## 2024-02-07 DIAGNOSIS — Z00.6 ENCOUNTER FOR EXAMINATION FOR NORMAL COMPARISON OR CONTROL IN CLINICAL RESEARCH PROGRAM: ICD-10-CM

## 2024-02-12 DIAGNOSIS — M54.2 NECK PAIN: ICD-10-CM

## 2024-02-13 RX ORDER — MELOXICAM 15 MG/1
15 TABLET ORAL DAILY
Qty: 30 TABLET | Refills: 5 | Status: SHIPPED | OUTPATIENT
Start: 2024-02-13

## 2024-02-15 ENCOUNTER — APPOINTMENT (OUTPATIENT)
Dept: LAB | Age: 54
End: 2024-02-15

## 2024-02-15 DIAGNOSIS — Z00.6 ENCOUNTER FOR EXAMINATION FOR NORMAL COMPARISON OR CONTROL IN CLINICAL RESEARCH PROGRAM: ICD-10-CM

## 2024-02-15 PROCEDURE — 36415 COLL VENOUS BLD VENIPUNCTURE: CPT

## 2024-03-24 LAB
APOB+LDLR+PCSK9 GENE MUT ANL BLD/T: NOT DETECTED
BRCA1+BRCA2 DEL+DUP + FULL MUT ANL BLD/T: NOT DETECTED
MLH1+MSH2+MSH6+PMS2 GN DEL+DUP+FUL M: NOT DETECTED

## 2024-03-29 ENCOUNTER — TELEPHONE (OUTPATIENT)
Dept: RHEUMATOLOGY | Facility: CLINIC | Age: 54
End: 2024-03-29

## 2024-04-08 ENCOUNTER — TELEPHONE (OUTPATIENT)
Dept: OBGYN CLINIC | Facility: MEDICAL CENTER | Age: 54
End: 2024-04-08

## 2024-04-15 ENCOUNTER — TELEPHONE (OUTPATIENT)
Age: 54
End: 2024-04-15

## 2024-04-15 NOTE — TELEPHONE ENCOUNTER
Sarai called in regarding her appointment tomorrow . Patient stated she doesn't have her copay for tomorrow visit she wanted to know if she can still be seen and billed for copay . I did advise th patient she will be billed for her tomorrow rheum visit

## 2024-04-16 ENCOUNTER — CONSULT (OUTPATIENT)
Dept: RHEUMATOLOGY | Facility: CLINIC | Age: 54
End: 2024-04-16
Payer: COMMERCIAL

## 2024-04-16 ENCOUNTER — APPOINTMENT (OUTPATIENT)
Dept: LAB | Facility: CLINIC | Age: 54
End: 2024-04-16
Payer: COMMERCIAL

## 2024-04-16 VITALS
OXYGEN SATURATION: 99 % | BODY MASS INDEX: 34.25 KG/M2 | SYSTOLIC BLOOD PRESSURE: 120 MMHG | HEIGHT: 68 IN | HEART RATE: 92 BPM | WEIGHT: 226 LBS | DIASTOLIC BLOOD PRESSURE: 82 MMHG

## 2024-04-16 DIAGNOSIS — R79.89 LOW VITAMIN D LEVEL: ICD-10-CM

## 2024-04-16 DIAGNOSIS — M45.0 ANKYLOSING SPONDYLITIS OF MULTIPLE SITES IN SPINE (HCC): Primary | ICD-10-CM

## 2024-04-16 DIAGNOSIS — M79.10 MUSCLE PAIN: ICD-10-CM

## 2024-04-16 LAB
25(OH)D3 SERPL-MCNC: 8.3 NG/ML (ref 30–100)
ALBUMIN SERPL BCP-MCNC: 4.1 G/DL (ref 3.5–5)
ALP SERPL-CCNC: 79 U/L (ref 34–104)
ALT SERPL W P-5'-P-CCNC: 8 U/L (ref 7–52)
ANION GAP SERPL CALCULATED.3IONS-SCNC: 5 MMOL/L (ref 4–13)
AST SERPL W P-5'-P-CCNC: 12 U/L (ref 13–39)
BASOPHILS # BLD AUTO: 0.06 THOUSANDS/ÂΜL (ref 0–0.1)
BASOPHILS NFR BLD AUTO: 1 % (ref 0–1)
BILIRUB SERPL-MCNC: 0.3 MG/DL (ref 0.2–1)
BUN SERPL-MCNC: 13 MG/DL (ref 5–25)
CALCIUM SERPL-MCNC: 9.9 MG/DL (ref 8.4–10.2)
CHLORIDE SERPL-SCNC: 107 MMOL/L (ref 96–108)
CO2 SERPL-SCNC: 27 MMOL/L (ref 21–32)
CREAT SERPL-MCNC: 0.83 MG/DL (ref 0.6–1.3)
CRP SERPL QL: 2.3 MG/L
EOSINOPHIL # BLD AUTO: 0.58 THOUSAND/ÂΜL (ref 0–0.61)
EOSINOPHIL NFR BLD AUTO: 6 % (ref 0–6)
ERYTHROCYTE [DISTWIDTH] IN BLOOD BY AUTOMATED COUNT: 13.2 % (ref 11.6–15.1)
ERYTHROCYTE [SEDIMENTATION RATE] IN BLOOD: 12 MM/HOUR (ref 0–29)
GFR SERPL CREATININE-BSD FRML MDRD: 79 ML/MIN/1.73SQ M
GLUCOSE SERPL-MCNC: 98 MG/DL (ref 65–140)
HCT VFR BLD AUTO: 43.2 % (ref 34.8–46.1)
HGB BLD-MCNC: 14.4 G/DL (ref 11.5–15.4)
IMM GRANULOCYTES # BLD AUTO: 0.04 THOUSAND/UL (ref 0–0.2)
IMM GRANULOCYTES NFR BLD AUTO: 0 % (ref 0–2)
LYMPHOCYTES # BLD AUTO: 2.35 THOUSANDS/ÂΜL (ref 0.6–4.47)
LYMPHOCYTES NFR BLD AUTO: 23 % (ref 14–44)
MCH RBC QN AUTO: 31.4 PG (ref 26.8–34.3)
MCHC RBC AUTO-ENTMCNC: 33.3 G/DL (ref 31.4–37.4)
MCV RBC AUTO: 94 FL (ref 82–98)
MONOCYTES # BLD AUTO: 0.76 THOUSAND/ÂΜL (ref 0.17–1.22)
MONOCYTES NFR BLD AUTO: 7 % (ref 4–12)
NEUTROPHILS # BLD AUTO: 6.43 THOUSANDS/ÂΜL (ref 1.85–7.62)
NEUTS SEG NFR BLD AUTO: 63 % (ref 43–75)
NRBC BLD AUTO-RTO: 0 /100 WBCS
PLATELET # BLD AUTO: 206 THOUSANDS/UL (ref 149–390)
PMV BLD AUTO: 12 FL (ref 8.9–12.7)
POTASSIUM SERPL-SCNC: 4.6 MMOL/L (ref 3.5–5.3)
PROT SERPL-MCNC: 6.8 G/DL (ref 6.4–8.4)
RBC # BLD AUTO: 4.59 MILLION/UL (ref 3.81–5.12)
SODIUM SERPL-SCNC: 139 MMOL/L (ref 135–147)
WBC # BLD AUTO: 10.22 THOUSAND/UL (ref 4.31–10.16)

## 2024-04-16 PROCEDURE — 80053 COMPREHEN METABOLIC PANEL: CPT | Performed by: INTERNAL MEDICINE

## 2024-04-16 PROCEDURE — 99204 OFFICE O/P NEW MOD 45 MIN: CPT | Performed by: INTERNAL MEDICINE

## 2024-04-16 PROCEDURE — 82306 VITAMIN D 25 HYDROXY: CPT | Performed by: INTERNAL MEDICINE

## 2024-04-16 PROCEDURE — 86140 C-REACTIVE PROTEIN: CPT | Performed by: INTERNAL MEDICINE

## 2024-04-16 PROCEDURE — 85652 RBC SED RATE AUTOMATED: CPT | Performed by: INTERNAL MEDICINE

## 2024-04-16 PROCEDURE — 36415 COLL VENOUS BLD VENIPUNCTURE: CPT | Performed by: INTERNAL MEDICINE

## 2024-04-16 PROCEDURE — 85025 COMPLETE CBC W/AUTO DIFF WBC: CPT | Performed by: INTERNAL MEDICINE

## 2024-04-16 RX ORDER — CYCLOBENZAPRINE HCL 10 MG
10 TABLET ORAL
Qty: 30 TABLET | Refills: 6 | Status: SHIPPED | OUTPATIENT
Start: 2024-04-16

## 2024-04-16 RX ORDER — DICLOFENAC SODIUM 75 MG/1
75 TABLET, DELAYED RELEASE ORAL 2 TIMES DAILY
Qty: 60 TABLET | Refills: 6 | Status: SHIPPED | OUTPATIENT
Start: 2024-04-16 | End: 2024-05-01 | Stop reason: ALTCHOICE

## 2024-04-16 RX ORDER — AMOXICILLIN AND CLAVULANATE POTASSIUM 875; 125 MG/1; MG/1
TABLET, FILM COATED ORAL
COMMUNITY
Start: 2024-02-26

## 2024-04-16 NOTE — PATIENT INSTRUCTIONS
Do labs  Do back x-rays  Stop meloxicam, start diclofenac twice a day as needed for joint pain, take with food  Stop methocarbamol, take cyclobenzaprine at bedtime for muscle pain  Physical therapy referral made    Return to clinic in 6 months

## 2024-04-16 NOTE — PROGRESS NOTES
Assessment and Plan:  Sarai Cifuentes is a 53 y.o.  female who presents as a Rheumatology consult referred by Samuel Aquino DO for evaluation of possible ankylosing spondylitis. Was diagnosed with it in 2012 by rheumatologist Dr. Squires, but has had no treatment over the years. Her symptoms are more consistent with DDD, but will do further seronegative spondyloarthropathy workup. Had stopped meloxicam and started diclofenac, but it caused side effects, so we will put patient back on meloxicam.    Do labs  Do back x-rays  Resolved  Stop methocarbamol, take cyclobenzaprine at bedtime for muscle pain  Physical therapy referral made    Return to clinic in 6 months    Plan:  1. Ankylosing spondylitis of multiple sites in spine (HCC)  -     Ambulatory referral to Physical Therapy; Future  -     XR spine thoracic 2 vw; Future  -     XR sacroiliac joints < 3 views; Future; Expected date: 04/16/2024  -     CBC and differential  -     C-reactive protein  -     Comprehensive metabolic panel  -     Sedimentation rate, automated  -     HLA-B27 antigen  -     diclofenac (VOLTAREN) 75 mg EC tablet; Take 1 tablet (75 mg total) by mouth 2 (two) times a day As needed for joint pain, take with food  -     cyclobenzaprine (FLEXERIL) 10 mg tablet; Take 1 tablet (10 mg total) by mouth daily at bedtime    2. Low vitamin D level  -     Vitamin D 25 hydroxy    3. Muscle pain  -     cyclobenzaprine (FLEXERIL) 10 mg tablet; Take 1 tablet (10 mg total) by mouth daily at bedtime    RTC in 6 months      Chief Complaint  New patient evaluation for possible ankylosing spondylitis    HPI  Sarai Cifuentes is a 53 y.o.  female who presents as a Rheumatology consult referred by Samuel Aquino DO. She has a previous history of ankylosing spondylitis. She consents to the use of SCOTTY.    She was diagnosed with ankylosing spondylitis in 2012 by Dr. Squires. She began experiencing severe arthritis symptoms in her mid-to-late 20s, which  intensified in her 30s and have progressively worsened.    She reports persistent stiffness and soreness in her joints, with varying intensity. Currently, she experiences discomfort, albeit not severe. The pain originates at the top of her neck, radiates down her spine, shoulders, elbows, and fingers. The most severe pain is in her neck, lower back, middle back, hands, and fingers. She experiences pain in her shoulders, knees, ankles, and feet. She describes her pain as achy and throbbing, but not sharp or shooting. The pain does not radiate to her extremities. Her lower back pain has not improved, and she experiences sciatica occasionally. She finds it difficult to sleep due to severe pain. Her pain is severe in the morning, which improves with movement. Her pain is exacerbated by lying prone or sitting at a desk, but slightly improves with walking. She reports a popping sensation when she moves. She used to be able to put her palms flat. Currently, she is double-jointed. These episodes occur infrequently, approximately once every few years. She underwent MRIs and x-rays due to persistent sciatica, which revealed synovial cysts and significant degenerative changes, but no fractures. The MRI was ordered by a provider at [facility]. She was advised by her doctor to undergo injections for her back, which she declined. She saw Dr. Mares, and the notes in 05/02/2023 state that the x-ray showed moderate to severe arthritis with moderate to severe multilevel degenerative changes from C5-C7. She declined to receive epidural injections for the back. She was advised to see physical therapy, but her chiropractic treatment was denied by her insurance.    She has been on meloxicam for approximately 1.5 years, which provides minimal relief. She reported feeling pain when she forgot to take meloxicam for a few days during a vacation with her daughter last summer. She also took ibuprofen 800 mg but discontinued it due to  concerns about gastrointestinal upset. She has tried over-the-counter naproxen, which did not provide significant relief. She has a history of acid reflux but does not believe that ibuprofen is contributing to her stomach upset. She occasionally takes methocarbamol 750 mg 2 tablets when she experiences muscle tension, which does not provide relief. She was recommended gabapentin, but she declined due to concerns about side effects. She has not tried Flexeril.    She intends to lose a significant amount of weight while also expressing concern regarding her vitamin D levels. She had hepatitis C infection whcih was treated.    She has reduced her smoking from 2 packs a day to 2 packs a week.     Denies photosensitivity, rashes, psoriasis, sicca symptoms, oral or nasal ulcers, alopecia, Raynaud's, h/o pericarditis or pleurisy, h/o blood clots or miscarriages.    [Her daughter has inflammatory arthritis.]    Occupation: She used to be a  but has stopped.       Review of Systems  Pertinent ROS positive for fatigue, headaches, dry eyes, eye discharge, dry mouth, constipation, congestion, joint pain, joint swelling, back pain, neck pain, muscle pain, heat intolerance, and leg swelling. Rest of 14 point ROS reviewed and were negative.  Answers submitted by the patient for this visit:  Back Pain Questionnaire (Submitted on 4/14/2024)  Chief Complaint: Back pain  Chronicity: chronic  Onset: more than 1 year ago  Frequency: constantly  Progression since onset: unchanged  Pain location: gluteal, lumbar spine, sacro-iliac, thoracic spine  Pain quality: aching  Radiates to: left foot, left thigh  Pain - numeric: 6/10  Pain is: the same all the time  Aggravated by: position, lying down, sitting, standing  Stiffness is present: all day  abdominal pain: No  bladder incontinence: No  bowel incontinence: No  chest pain: No  dysuria: No  fever: No  headaches: Yes  leg pain: No  numbness: No  paresis: No  paresthesias:  No  pelvic pain: No  perianal numbness: No  tingling: No  weakness: No  weight loss: No  Risk factors: lack of exercise, obesity, poor posture, sedentary lifestyle      Allergies  Allergies   Allergen Reactions    Bactrim [Sulfamethoxazole-Trimethoprim] Rash       Home Medications    Current Outpatient Medications:     cyclobenzaprine (FLEXERIL) 10 mg tablet, Take 1 tablet (10 mg total) by mouth daily at bedtime, Disp: 30 tablet, Rfl: 6    diclofenac (VOLTAREN) 75 mg EC tablet, Take 1 tablet (75 mg total) by mouth 2 (two) times a day As needed for joint pain, take with food, Disp: 60 tablet, Rfl: 6    nicotine (NICODERM CQ) 21 mg/24 hr TD 24 hr patch, PLACE 1 PATCH ON THE SKIN OVER 24 HOURS EVERY 24 HOURS (Patient taking differently: Place 1 patch on the skin every 24 hours PRN), Disp: 28 patch, Rfl: 1    amoxicillin-clavulanate (AUGMENTIN) 875-125 mg per tablet, take 1 tablet by mouth twice a day until finished (Patient not taking: Reported on 4/16/2024), Disp: , Rfl:     Lidocaine 4 % PTCH, Apply topically (Patient not taking: Reported on 5/19/2023), Disp: , Rfl:     metoprolol succinate (TOPROL-XL) 25 mg 24 hr tablet, Take 1 tablet (25 mg total) by mouth daily (Patient not taking: Reported on 5/8/2023), Disp: 30 tablet, Rfl: 2    nicotine polacrilex (COMMIT) 2 MG lozenge, Apply 1 lozenge (2 mg total) to the mouth or throat as needed for smoking cessation (Patient not taking: Reported on 6/14/2023), Disp: 100 each, Rfl: 1    polyethylene glycol (GLYCOLAX) 17 GM/SCOOP powder, Take 17 g by mouth daily (Patient not taking: Reported on 6/14/2023), Disp: 850 g, Rfl: 2    polyethylene glycol (Golytely) 4000 mL solution, Take 4,000 mL by mouth once for 1 dose Take 4000 mL by mouth once for 1 dose. Use as directed, Disp: 4000 mL, Rfl: 0    Past Medical History  Past Medical History:   Diagnosis Date    Hepatitis C        Past Surgical History   Past Surgical History:   Procedure Laterality Date    ANKLE SURGERY Right      "TUBAL LIGATION         Family History    Family History   Problem Relation Age of Onset    Hypertension Mother     Stomach cancer Maternal Grandmother     Alzheimer's disease Maternal Grandfather        Social History  Social History     Substance and Sexual Activity   Alcohol Use Not Currently     Social History     Substance and Sexual Activity   Drug Use Never     Social History     Tobacco Use   Smoking Status Every Day    Current packs/day: 2.00    Types: Cigarettes   Smokeless Tobacco Never       Objective:  Vitals:    04/16/24 1322   BP: 120/82   Pulse: 92   SpO2: 99%   Weight: 103 kg (226 lb)   Height: 5' 7.75\"       Physical Exam:  PHYSICAL EXAM:  Constitutional:    General: She is not in acute distress.  HENT:   Head: Normocephalic and atraumatic.   Eyes:   Conjunctiva/sclera: Conjunctivae normal.   Cardiovascular:   Rate and Rhythm: Normal rate and regular rhythm.   Heart sounds: S1 normal and S2 normal.   No friction rub.   Pulmonary:   Effort: Pulmonary effort is normal. No respiratory distress.   Breath sounds: Normal breath sounds. No wheezing, rhonchi or rales.   Musculoskeletal:  Lumbar spine and thoracic spine tenderness. Right elbow tenderness.  Cervical back: Neck supple.   Skin:  General: Skin is warm and dry.   Coloration: Skin is not pale.   Findings: No rash.   Neurological:   Mental Status: She is alert. Mental status is at baseline.   Psychiatric:      Mood and Affect: Mood normal.      Behavior: Behavior normal.       I have personally reviewed results with the patient.    Imaging:   No results found.     Labs:   Appointment on 02/15/2024   Component Date Value Ref Range Status    LINDO SYNDROME DNA ANALYSIS 02/15/2024 Not Detected   Final    Comment: Pathogenic variant not detected.  No pathogenic or likely pathogenic variants were found in the 11 genes included on this test. This result reduces (but does not eliminate) the likelihood of a diagnosis of hereditary breast and ovarian cancer " (HBOC) syndrome, Carlisle syndrome, and familial hypercholesterolemia (FH) for this individual. Other clinical diagnostic testing for these three conditions could identify variants not detected by this test. If this individual has had previous testing, these results should be taken into consideration during risk assessments and medical management.  No pathogenic or likely pathogenic variants were detected in the genes associated with Carlisle syndrome. The genes tested for this condition were MLH1, MSH2, MSH6, PMS2, and EPCAM.  Evaluation of 11 genes associated with hereditary breast and ovarian cancer (HBOC) syndrome, Carlisle syndrome and familial hypercholesterolemia (FH). The genes tested are BRCA1, BRCA2, MLH1, MSH2, MSH6, PMS2, EPCAM, APOB, LDLR,                            LDLRAP1, and PCSK9.  Genes tested: BRCA1,BRCA2,MLH1,MSH2,MSH6,PMS2,EPCAM,APOB,LDLR,LDLRAP1,PCSK9  Interpretive report performed and validated by [facility] ([facility]) Laboratory; [address] [place] [address] (CLIA# [id number], CAP #[id number]). A portion of testing may have been performed at a remote location. A list of remote personnel is available upon request. Sequencing done at: [employer] [address] [place] [address], [address], [place] [address]. (CLIA# [id number], CAP #[id number]). This test has not been cleared or approved by the U.S. Food and Drug Administration.  approvals: Ino Castro, Ph.D. [facility]  email: [email or web address]    HEREDITARY BREAST & OVARIAN CANCER* 02/15/2024 Not Detected   Final    Comment: Pathogenic variant not detected.  No pathogenic or likely pathogenic variants were found in the 11 genes included on this test. This result reduces (but does not eliminate) the likelihood of a diagnosis of hereditary breast and ovarian cancer (HBOC) syndrome, Carlisle syndrome, and familial hypercholesterolemia (FH) for this individual. Other clinical diagnostic testing for these three conditions could identify  variants not detected by this test. If this individual has had previous testing, these results should be taken into consideration during risk assessments and medical management.  No pathogenic or likely pathogenic variants were detected in the genes associated with hereditary breast and ovarian cancer (HBOC) syndrome. The genes tested for this condition were BRCA1 and BRCA2.  Evaluation of 11 genes associated with hereditary breast and ovarian cancer (HBOC) syndrome, Carlisle syndrome and familial hypercholesterolemia (FH). The genes tested are BRCA1, BRCA2, MLH1, MSH2, MSH6, PMS2,                            EPCAM, APOB, LDLR, LDLRAP1, and PCSK9.  Genes tested: BRCA1,BRCA2,MLH1,MSH2,MSH6,PMS2,EPCAM,APOB,LDLR,LDLRAP1,PCSK9  Interpretive report performed and validated by [facility]  ([facility]) Laboratory; [address] [place] [id number] (CLIA# [id number], CAP #[id number]). A portion of testing may have been performed at a remote location. A list of remote personnel is available upon request. Sequencing done at: [employer] [address] [place] [address], [address], [place] [address]. (CLIA# [id number], CAP #[id number]). This test has not been cleared or approved by the U.S. Food and Drug Administration.  approvals: Ino Castro, Ph.D. [facility]  email: [email or web address]    FAMILIAL HYPERCHOLESTEROLEMIA DNA * 02/15/2024 Not Detected   Final    Comment: Pathogenic variant not detected.  No pathogenic or likely pathogenic variants were found in the 11 genes included on this test. This result reduces (but does not eliminate) the likelihood of a diagnosis of hereditary breast and ovarian cancer (HBOC) syndrome, Carlisle syndrome, and familial hypercholesterolemia (FH) for this individual. Other clinical diagnostic testing for these three conditions could identify variants not detected by this test. If this individual has had previous testing, these results should be taken into consideration during risk assessments  and medical management.  No pathogenic or likely pathogenic variants were detected in the genes associated with familial hypercholesterolemia (FH). The genes tested for this condition were APOB, LDLR, LDLRAP1, and PCSK9.  Evaluation of 11 genes associated with hereditary breast and ovarian cancer (HBOC) syndrome, Carlisle syndrome and familial hypercholesterolemia (FH). The genes tested are BRCA1, BRCA2, MLH1, MSH2, MSH6, PMS2,                            EPCAM, APOB, LDLR, LDLRAP1, and PCSK9.  Genes tested: BRCA1,BRCA2,MLH1,MSH2,MSH6,PMS2,EPCAM,APOB,LDLR,LDLRAP1,PCSK9  Methods and Limitations: DNA extracted from this individual's sample was captured and enriched using a custom set of reagents (Helix Exome+ chemistry). Targeted regions were then sequenced using an Illumina DNA sequencing system. The individual's sequence was then matched to a modified version of the industry standard reference genome (GRCh38). Variant calling was completed using a customized version of [email or web address] [facility] software, requiring 20x coverage for validated variant calls. Copy number variants (CNVs) were called using a proprietary bioinfomatics pipeline that compared the coverage profile of the sample with the coverage profiles of other reference set samples. [facility] then analyzed the generated variant data for the exons and 10 bp of flanking intronic sequence (and select tagged intronic variants) of the 11 genes included in Shared Performance from the                            [employer] Database. The sample was reviewed for single nucleotide variants (SNVs), indels up to 20 bp in length, and CNVs that are known or predicted to be actionable. NOTE: This assay has limited sensitivity to CNVs smaller than a few exons. APOB, PCSK9, and LDLR interpretation and reporting is specific to the familial hypercholesterolemia phenotype. Variants associated with other phenotypes such as hypobetalipoproteinemia are not included. Some known  complex variants like the inversion of exons 1-7 in the MSH2 gene (Homar inversion), exons 11-15 of the PMS2 gene, or variants within or immediately adjacent to long homopolymer runs are not analyzed or reported. Additionally, there are regions that are not covered, such as deep intronic, promoter, and enhancer regions. It is important to note that this assay cannot detect all variants known to increase disease risk. Other clinical diagnostic testing for these three conditions could identify variants not detected by this Helix test.                            If this individual has had previous testing, these results should be taken into consideration during risk assessments and medical management.  Interpretive report performed and validated by [facility]  ([facility]) Laboratory; [address] [place] [address] (CLIA# [id number], CAP #[id number]). A portion of testing may have been performed at a remote location. A list of remote personnel is available upon request. Sequencing done at: [employer] [address] [place] [address], [address], [place] [address]. (CLIA# [id number], CAP #[id number]). This test has not been cleared or approved by the U.S. Food and Drug Administration.  approvals: Ino Castro, Ph.D. [facility]  email: [email or web address]   Appointment on 11/10/2023   Component Date Value Ref Range Status    HCV PCR Quantitative 11/10/2023 HCV Not Detected  IU/mL Final    Test Information 11/10/2023 Comment   Final    The quantitative range of this assay is 15 IU/mL to 100 million IU/mL.       Transcribed for Grant Benites MD, by Harish Delacruz on 04/16/24 at 5:23 PM. Powered by [employer].

## 2024-04-26 DIAGNOSIS — E55.9 VITAMIN D DEFICIENCY: Primary | ICD-10-CM

## 2024-04-26 RX ORDER — ERGOCALCIFEROL 1.25 MG/1
50000 CAPSULE ORAL WEEKLY
Qty: 12 CAPSULE | Refills: 1 | Status: SHIPPED | OUTPATIENT
Start: 2024-04-26

## 2024-04-29 LAB — HLA-B27 QL NAA+PROBE: NORMAL

## 2024-04-30 ENCOUNTER — PATIENT MESSAGE (OUTPATIENT)
Dept: RHEUMATOLOGY | Facility: CLINIC | Age: 54
End: 2024-04-30

## 2024-05-01 RX ORDER — MELOXICAM 15 MG/1
15 TABLET ORAL DAILY
Qty: 30 TABLET | Refills: 6 | Status: SHIPPED | OUTPATIENT
Start: 2024-05-01

## 2024-11-22 DIAGNOSIS — M45.0 ANKYLOSING SPONDYLITIS OF MULTIPLE SITES IN SPINE (HCC): ICD-10-CM

## 2024-11-22 RX ORDER — MELOXICAM 15 MG/1
15 TABLET ORAL DAILY
Qty: 30 TABLET | Refills: 5 | Status: SHIPPED | OUTPATIENT
Start: 2024-11-22

## 2024-12-04 ENCOUNTER — OFFICE VISIT (OUTPATIENT)
Dept: URGENT CARE | Age: 54
End: 2024-12-04
Payer: COMMERCIAL

## 2024-12-04 ENCOUNTER — APPOINTMENT (OUTPATIENT)
Dept: RADIOLOGY | Age: 54
End: 2024-12-04
Payer: COMMERCIAL

## 2024-12-04 VITALS
HEART RATE: 85 BPM | TEMPERATURE: 97.6 F | SYSTOLIC BLOOD PRESSURE: 126 MMHG | OXYGEN SATURATION: 100 % | RESPIRATION RATE: 18 BRPM | DIASTOLIC BLOOD PRESSURE: 87 MMHG

## 2024-12-04 DIAGNOSIS — S49.92XA INJURY OF LEFT UPPER ARM, INITIAL ENCOUNTER: ICD-10-CM

## 2024-12-04 DIAGNOSIS — S62.101A CLOSED FRACTURE OF RIGHT WRIST, INITIAL ENCOUNTER: Primary | ICD-10-CM

## 2024-12-04 PROCEDURE — 29125 APPL SHORT ARM SPLINT STATIC: CPT

## 2024-12-04 PROCEDURE — G0382 LEV 3 HOSP TYPE B ED VISIT: HCPCS

## 2024-12-04 PROCEDURE — 73080 X-RAY EXAM OF ELBOW: CPT

## 2024-12-04 NOTE — PROGRESS NOTES
"  Assessment/Plan  X-rays of left elbow reviewed, no acute abnormality noted, awaiting official read.   Splint re-applied to right wrist. Distal right upper extremity reassessed after new application, no signs of circulatory compromise.   Continue to alternate Tylenol and Ibuprofen as needed for pain.   Follow up with Orthopedics as soon as possible.     Closed fracture of right wrist, initial encounter [S62.101A]  1. Closed fracture of right wrist, initial encounter  Ambulatory Referral to Orthopedic Surgery      2. Injury of left upper arm, initial encounter  XR elbow 3+ vw left      Patient Education     Wrist fracture   The Basics   Written by the doctors and editors at [facility]   What is a wrist fracture? -- A \"fracture\" is another word for a broken bone. There are different types of fractures, depending on which bone breaks and how it breaks. When a bone breaks, it might crack, break all of the way through, or shatter.  There are different types of wrist fractures. Some involve a break in 1 of the small bones in the wrist. Others involve a break in 1 or both of the forearm bones near the wrist (figure 1). A common type of wrist fracture involves the end of 1 of the forearm bones. It can happen when a person falls onto their outstretched hand.  What are the symptoms of a wrist fracture? -- Symptoms depend on which bone breaks and the type of break it is. Common symptoms can include:   Pain, swelling, or bruising over the area   The area looking abnormal, bent, or not the usual shape   Not being able to move the wrist and hand, or stiffness   Numbness in the area of the broken bone or part of the hand  Is there a test for a wrist fracture? -- Yes. The doctor or nurse will ask about your symptoms, do an exam, and take an X-ray. They will also ask about how the injury happened.  They might also do other imaging tests, such as a CT or ultrasound. Imaging tests create pictures of the inside of the body.  How are " "wrist fractures treated? -- Treatment depends, in part, on the type of fracture and how serious it is. The goal is to have the ends of the broken bone line up with each other so the bone can heal.  If the ends of the broken bone are already in line with each other, the doctor will put a cast, splint, or brace on the wrist. This will keep the bone in the correct position so it can heal. Children with a very minor fracture called a \"buckle fracture\" might only need a gauze or elastic bandage.  If the ends of the broken bone are not in line with each other, the doctor will need to line them up:   Sometimes, they can move the bone to the correct position without doing surgery, and then put a cast, splint, or brace on. This is called \"closed fracture reduction.\" Most wrist fractures in children only require casting.   For more serious fractures and for some wrist fractures in adults, they might need to do surgery to put the bone back in the correct position. During surgery, they can use screws, pins, rods, or plates to fix the bone inside the body. This is called \"open fracture reduction.\" In some cases, the hardware is removed after the fracture is healed.  How long do wrist fractures take to heal? -- Most fractures take weeks to months to heal. The doctor or nurse will talk to you about when to return to things like work, sports, or other activities.  Healing time also depends on the person. Healthy children usually heal much more quickly than older adults or adults with other medical problems.  How do I care for myself at home? -- To care for yourself or your child at home:   Follow the doctor's instructions for wearing the splint, brace, or cast. This supports and protects the bone as it heals. Some fractures are placed in a cast right away. Other fractures are not put in a cast until after the swelling goes down. You might also get a sling to support your arm and wrist.   Do not get the cast wet unless the doctor " says that it is waterproof.   Follow instructions for limiting activity and movement until the bone is healed. The doctor or nurse will tell you what activities are safe to do.   Prop the injured arm on pillows, keeping it above the level of the heart. This might help lessen pain and swelling for the first several days after the injury.   The doctor might recommend an over-the-counter pain medicine. These include acetaminophen (sample brand name: Tylenol), ibuprofen (sample brand names: Advil, Motrin), and naproxen (sample brand name: Aleve).   Some people get a prescription for stronger pain medicines to take for a short time. Follow the instructions for taking these medicines.   Ice can help with pain and swelling:   Put a cold gel pack, bag of ice, or bag of frozen vegetables on the injured area every 1 to 2 hours, for 15 minutes each time. Put a thin towel between the ice (or other cold object) and the skin.   Use the ice (or other cold object) for at least 6 hours after the injury. Some people find it helpful to ice longer, even up to 2 days after their injury.   Eat a healthy diet that includes plenty of calcium, vitamin D, and protein (figure 2).   If you smoke, try to stop. Broken bones take longer to heal if you smoke.   Some people need to work with a physical therapist (exercise expert) after their fracture heals. The physical therapist will suggest exercises and stretches to strengthen the wrist and hand muscles and keep them from getting stiff.  When should I call the doctor? -- Call for advice if:   There is less feeling or movement in your fingers.   Your hand or wrist becomes swollen or starts to hurt more.   Your skin becomes red or irritated around the cast, or the redness starts to spread up your arm.   The splint or cast feels too tight and uncomfortable.   There is a bad smell or drainage coming from the splint or cast.   The cast feels too loose, you notice a crack in the cast, or the cast  "becomes soft.   The cast gets wet, and it is not supposed to get wet.  All topics are updated as new evidence becomes available and our peer review process is complete.  This topic retrieved from [employer] on: Mar 21, 2024.  Topic 415970 Version 1.0  Release: 32.2.4 - C32.79  © 2024 Up[employer]Date, Inc. and/or its affiliates. All rights reserved.  figure 1: Hand and wrist bones     This drawing shows the bones of the hand and wrist.  Graphic 54443 Version 2.0  figure 2: Foods and drinks with calcium and vitamin D     Foods rich in calcium include ice cream, soy milk, breads, kale, broccoli, milk, cheese, cottage cheese, almonds, yogurt, ready-to-eat cereals, beans, and tofu. Foods rich in vitamin D include milk, fortified plant-based \"milks\" (soy, almond), canned tuna fish, cod liver oil, yogurt, ready-to-eat-cereals, cooked salmon, canned sardines, mackerel, and eggs. Some of these foods are rich in both.  Graphic 76489 Version 4.0  Consumer Information Use and Disclaimer   Disclaimer: This generalized information is a limited summary of diagnosis, treatment, and/or medication information. It is not meant to be comprehensive and should be used as a tool to help the user understand and/or assess potential diagnostic and treatment options. It does NOT include all information about conditions, treatments, medications, side effects, or risks that may apply to a specific patient. It is not intended to be medical advice or a substitute for the medical advice, diagnosis, or treatment of a health care provider based on the health care provider's examination and assessment of a patient's specific and unique circumstances. Patients must speak with a health care provider for complete information about their health, medical questions, and treatment options, including any risks or benefits regarding use of medications. This information does not endorse any treatments or medications as safe, effective, or approved for treating a " specific patient. Up[employer]Date, Inc. and its affiliates disclaim any warranty or liability relating to this information or the use thereof.The use of this information is governed by the Terms of Use, available at https://www.[employer].com/en/know/clinical-effectiveness-terms. 2024© UpToDate, Inc. and its affiliates and/or licensors. All rights reserved.  Copyright   © 2024 Up[employer]Date, Inc. and/or its affiliates. All rights reserved.         Subjective:     Patient ID: Sarai Cifuentes is a 54 y.o. adult.      Reason For Visit / Chief Complaint  Chief Complaint   Patient presents with    Fall     Patient states that on 11/28 she went to the ER for a fall and had a cast placed to the right wrist. She was supposed to see ortho but the soonest she could get an appt. Was 12/5. Patient was called and told that she would need surgery not to come in. She came in today since they cancelled her appt and she is having pain to the arm with the cast and would like it reset. She also is having left arm pain and is unsure if she has a fracture in that arm. She notes N/T intermittent in right hand.         Patient is a 54 year old female with PMH significant for hepatitis C, who presents with daughter for evaluation of right wrist and left elbow pain since sustaining a fall on 11/28/24. She reports that she fell forward up her daughter's front steps, catching herself on her outstretched hands. She was evaluated in the ED immediately after the incident, where record review indicates that she sustained an ulnar styloid and distal radius fracture of the right wrist. X-rays of the right ankle and left wrist were negative. A splint was placed on the right arm, and the patient was referred to [facility] orthopedics. Patient reports that the orthopedic department has rescheduled her appointment multiple times, and she has yet to be seen. She also notes that the splint is causing her pain in her wrist and forearm, and she notes occasional numbness  and swelling of the right 2nd-4th fingers. She also notes left elbow pain since the injury. She denies swelling or deformity of the left elbow, neck pain, dizziness, discoloration.           Past Medical History:   Diagnosis Date    Hepatitis C        Past Surgical History:   Procedure Laterality Date    ANKLE SURGERY Right     TUBAL LIGATION         Family History   Problem Relation Age of Onset    Hypertension Mother     Stomach cancer Maternal Grandmother     Alzheimer's disease Maternal Grandfather        Review of Systems   Constitutional:  Negative for fatigue and fever.   HENT:  Negative for congestion, ear discharge, ear pain, postnasal drip, rhinorrhea, sinus pressure, sinus pain, sneezing and sore throat.    Eyes: Negative.  Negative for pain, discharge, redness and itching.   Respiratory: Negative.  Negative for apnea, cough, choking, chest tightness, shortness of breath, wheezing and stridor.    Cardiovascular: Negative.  Negative for chest pain and palpitations.   Gastrointestinal: Negative.  Negative for diarrhea, nausea and vomiting.   Endocrine: Negative.  Negative for polydipsia, polyphagia and polyuria.   Genitourinary: Negative.  Negative for decreased urine volume and flank pain.   Musculoskeletal:  Positive for arthralgias and joint swelling. Negative for back pain, gait problem, myalgias, neck pain and neck stiffness.   Skin: Negative.  Negative for color change and rash.   Allergic/Immunologic: Negative.  Negative for environmental allergies.   Neurological:  Positive for numbness. Negative for dizziness, facial asymmetry, light-headedness and headaches.   Hematological: Negative.  Negative for adenopathy.   Psychiatric/Behavioral: Negative.         Objective:    /87 (Patient Position: Sitting, Cuff Size: Large)   Pulse 85   Temp 97.6 °F (36.4 °C)   Resp 18   SpO2 100%     Physical Exam  Vitals reviewed.   Constitutional:       General: She is not in acute distress.     Appearance:  Normal appearance. She is not ill-appearing, toxic-appearing or diaphoretic.      Interventions: She is not intubated.  HENT:      Head: Normocephalic and atraumatic.      Right Ear: Tympanic membrane, ear canal and external ear normal. There is no impacted cerumen.      Left Ear: Tympanic membrane, ear canal and external ear normal. There is no impacted cerumen.      Nose: Nose normal. No congestion or rhinorrhea.      Mouth/Throat:      Mouth: Mucous membranes are moist.      Pharynx: Oropharynx is clear. Uvula midline. No pharyngeal swelling, oropharyngeal exudate, posterior oropharyngeal erythema or uvula swelling.      Tonsils: No tonsillar exudate or tonsillar abscesses. 1+ on the right. 1+ on the left.   Eyes:      Extraocular Movements: Extraocular movements intact.      Conjunctiva/sclera: Conjunctivae normal.      Pupils: Pupils are equal, round, and reactive to light.   Cardiovascular:      Rate and Rhythm: Normal rate and regular rhythm.      Pulses: Normal pulses.      Heart sounds: Normal heart sounds, S1 normal and S2 normal. Heart sounds not distant. No murmur heard.     No friction rub. No gallop.   Pulmonary:      Effort: Pulmonary effort is normal. No tachypnea, bradypnea, accessory muscle usage, prolonged expiration, respiratory distress or retractions. She is not intubated.      Breath sounds: Normal breath sounds. No stridor, decreased air movement or transmitted upper airway sounds. No decreased breath sounds, wheezing, rhonchi or rales.   Chest:      Chest wall: No tenderness.   Musculoskeletal:         General: Normal range of motion.      Left elbow: No swelling, deformity, effusion or lacerations. Normal range of motion. Tenderness present in medial epicondyle and lateral epicondyle.        Arms:       Cervical back: Normal range of motion and neck supple. No rigidity or tenderness.      Comments: Right hand examined with original splint in place, capillary refill < 2 seconds, mild  swelling when compared to left hand. No pallor, coolness or discoloration. Full ROM of fingers.    Lymphadenopathy:      Cervical: No cervical adenopathy.   Skin:     General: Skin is warm and dry.      Capillary Refill: Capillary refill takes less than 2 seconds.      Findings: No erythema.   Neurological:      General: No focal deficit present.      Mental Status: She is alert.   Psychiatric:         Mood and Affect: Mood normal.     Cast application    Date/Time: 12/4/2024 4:00 PM    Performed by: NOELLE Lopez  Authorized by: NOELLE Lopez  Universal Protocol:  Procedure performed by: (applied by JENNIFER Blancas)  Risks and benefits: risks, benefits and alternatives were discussed  Consent given by: patient    Procedure details:     Splint type:  Sugar tong and short arm splint, static (forearm to hand)    Supplies:  Cotton padding and Ortho-Glass

## 2024-12-04 NOTE — PATIENT INSTRUCTIONS
X-rays of left elbow reviewed, no acute abnormality noted, awaiting official read.   Splint re-applied to right wrist.   Continue to alternate Tylenol and Ibuprofen as needed for pain.   Follow up with Orthopedics as soon as possible.

## 2024-12-05 ENCOUNTER — TELEPHONE (OUTPATIENT)
Dept: OBGYN CLINIC | Facility: HOSPITAL | Age: 54
End: 2024-12-05

## 2024-12-05 NOTE — TELEPHONE ENCOUNTER
Hello,    Please advise if a forced appointment can be accommodated for the patient:    Call back #: [phone]    Insurance: [employer]    Reason for appointment: Patient has R wrist fracture.    Seen at [facility] on 11/28 and not scheduled for appointment until next week  Patient seen at [facility] 12/4 and referred to ortho for appointment asap for fracture that will need surgery. Patient was placed in cast.    Requested doctor and/or location: prefers [facility].  Closest to her.       Thank you.

## 2025-05-13 ENCOUNTER — VBI (OUTPATIENT)
Dept: ADMINISTRATIVE | Facility: OTHER | Age: 55
End: 2025-05-13

## 2025-05-13 NOTE — TELEPHONE ENCOUNTER
05/13/25 10:53 AM    The patient was called for Osteoporosis Management Post Fracture and a message was left for the patient to return the call to the VBI Department at [facility]: Phone [phone].    Thank you.  Amy Shin MA  [facility]

## 2025-06-08 DIAGNOSIS — M45.0 ANKYLOSING SPONDYLITIS OF MULTIPLE SITES IN SPINE (HCC): ICD-10-CM

## 2025-06-09 DIAGNOSIS — M45.0 ANKYLOSING SPONDYLITIS OF MULTIPLE SITES IN SPINE (HCC): ICD-10-CM

## 2025-06-10 RX ORDER — MELOXICAM 15 MG/1
15 TABLET ORAL DAILY
Qty: 30 TABLET | Refills: 5 | OUTPATIENT
Start: 2025-06-10

## 2025-06-11 RX ORDER — MELOXICAM 15 MG/1
15 TABLET ORAL DAILY
Qty: 30 TABLET | Refills: 3 | Status: SHIPPED | OUTPATIENT
Start: 2025-06-11